# Patient Record
Sex: FEMALE | Race: OTHER
[De-identification: names, ages, dates, MRNs, and addresses within clinical notes are randomized per-mention and may not be internally consistent; named-entity substitution may affect disease eponyms.]

---

## 2017-10-02 ENCOUNTER — APPOINTMENT (OUTPATIENT)
Dept: ORTHOPEDIC SURGERY | Facility: CLINIC | Age: 77
End: 2017-10-02
Payer: MEDICARE

## 2017-10-02 VITALS — SYSTOLIC BLOOD PRESSURE: 154 MMHG | HEART RATE: 80 BPM | DIASTOLIC BLOOD PRESSURE: 78 MMHG

## 2017-10-02 VITALS — HEIGHT: 61 IN | WEIGHT: 135 LBS | BODY MASS INDEX: 25.49 KG/M2

## 2017-10-02 DIAGNOSIS — Z56.0 UNEMPLOYMENT, UNSPECIFIED: ICD-10-CM

## 2017-10-02 DIAGNOSIS — Z78.9 OTHER SPECIFIED HEALTH STATUS: ICD-10-CM

## 2017-10-02 DIAGNOSIS — Z82.61 FAMILY HISTORY OF ARTHRITIS: ICD-10-CM

## 2017-10-02 PROCEDURE — 73564 X-RAY EXAM KNEE 4 OR MORE: CPT | Mod: RT

## 2017-10-02 PROCEDURE — 99204 OFFICE O/P NEW MOD 45 MIN: CPT

## 2017-10-02 RX ORDER — LEVOTHYROXINE SODIUM 137 UG/1
TABLET ORAL
Refills: 0 | Status: ACTIVE | COMMUNITY

## 2017-10-02 SDOH — ECONOMIC STABILITY - INCOME SECURITY: UNEMPLOYMENT, UNSPECIFIED: Z56.0

## 2018-01-22 ENCOUNTER — OUTPATIENT (OUTPATIENT)
Dept: OUTPATIENT SERVICES | Facility: HOSPITAL | Age: 78
LOS: 1 days | End: 2018-01-22
Payer: MEDICARE

## 2018-01-22 VITALS
HEART RATE: 80 BPM | SYSTOLIC BLOOD PRESSURE: 140 MMHG | DIASTOLIC BLOOD PRESSURE: 80 MMHG | TEMPERATURE: 98 F | OXYGEN SATURATION: 96 % | WEIGHT: 147.93 LBS | RESPIRATION RATE: 16 BRPM | HEIGHT: 59.25 IN

## 2018-01-22 DIAGNOSIS — R94.31 ABNORMAL ELECTROCARDIOGRAM [ECG] [EKG]: ICD-10-CM

## 2018-01-22 DIAGNOSIS — Z98.890 OTHER SPECIFIED POSTPROCEDURAL STATES: Chronic | ICD-10-CM

## 2018-01-22 DIAGNOSIS — M17.11 UNILATERAL PRIMARY OSTEOARTHRITIS, RIGHT KNEE: ICD-10-CM

## 2018-01-22 DIAGNOSIS — E03.9 HYPOTHYROIDISM, UNSPECIFIED: ICD-10-CM

## 2018-01-22 LAB
APPEARANCE UR: CLEAR — SIGNIFICANT CHANGE UP
BACTERIA # UR AUTO: SIGNIFICANT CHANGE UP
BILIRUB UR-MCNC: NEGATIVE — SIGNIFICANT CHANGE UP
BLD GP AB SCN SERPL QL: NEGATIVE — SIGNIFICANT CHANGE UP
BLOOD UR QL VISUAL: NEGATIVE — SIGNIFICANT CHANGE UP
BUN SERPL-MCNC: 16 MG/DL — SIGNIFICANT CHANGE UP (ref 7–23)
CALCIUM SERPL-MCNC: 9.2 MG/DL — SIGNIFICANT CHANGE UP (ref 8.4–10.5)
CHLORIDE SERPL-SCNC: 102 MMOL/L — SIGNIFICANT CHANGE UP (ref 98–107)
CO2 SERPL-SCNC: 29 MMOL/L — SIGNIFICANT CHANGE UP (ref 22–31)
COLOR SPEC: YELLOW — SIGNIFICANT CHANGE UP
CREAT SERPL-MCNC: 0.75 MG/DL — SIGNIFICANT CHANGE UP (ref 0.5–1.3)
GLUCOSE SERPL-MCNC: 83 MG/DL — SIGNIFICANT CHANGE UP (ref 70–99)
GLUCOSE UR-MCNC: NEGATIVE — SIGNIFICANT CHANGE UP
HBA1C BLD-MCNC: 5.7 % — HIGH (ref 4–5.6)
HCT VFR BLD CALC: 47.1 % — HIGH (ref 34.5–45)
HGB BLD-MCNC: 14.7 G/DL — SIGNIFICANT CHANGE UP (ref 11.5–15.5)
KETONES UR-MCNC: NEGATIVE — SIGNIFICANT CHANGE UP
LEUKOCYTE ESTERASE UR-ACNC: HIGH
MCHC RBC-ENTMCNC: 29.9 PG — SIGNIFICANT CHANGE UP (ref 27–34)
MCHC RBC-ENTMCNC: 31.2 % — LOW (ref 32–36)
MCV RBC AUTO: 95.7 FL — SIGNIFICANT CHANGE UP (ref 80–100)
MUCOUS THREADS # UR AUTO: SIGNIFICANT CHANGE UP
NITRITE UR-MCNC: NEGATIVE — SIGNIFICANT CHANGE UP
NRBC # FLD: 0 — SIGNIFICANT CHANGE UP
PH UR: 5.5 — SIGNIFICANT CHANGE UP (ref 4.6–8)
PLATELET # BLD AUTO: 238 K/UL — SIGNIFICANT CHANGE UP (ref 150–400)
PMV BLD: 10.4 FL — SIGNIFICANT CHANGE UP (ref 7–13)
POTASSIUM SERPL-MCNC: 3.9 MMOL/L — SIGNIFICANT CHANGE UP (ref 3.5–5.3)
POTASSIUM SERPL-SCNC: 3.9 MMOL/L — SIGNIFICANT CHANGE UP (ref 3.5–5.3)
PROT UR-MCNC: 20 MG/DL — SIGNIFICANT CHANGE UP
RBC # BLD: 4.92 M/UL — SIGNIFICANT CHANGE UP (ref 3.8–5.2)
RBC # FLD: 13 % — SIGNIFICANT CHANGE UP (ref 10.3–14.5)
RBC CASTS # UR COMP ASSIST: SIGNIFICANT CHANGE UP (ref 0–?)
RH IG SCN BLD-IMP: POSITIVE — SIGNIFICANT CHANGE UP
SODIUM SERPL-SCNC: 142 MMOL/L — SIGNIFICANT CHANGE UP (ref 135–145)
SP GR SPEC: 1.03 — SIGNIFICANT CHANGE UP (ref 1–1.04)
SQUAMOUS # UR AUTO: SIGNIFICANT CHANGE UP
UROBILINOGEN FLD QL: NORMAL MG/DL — SIGNIFICANT CHANGE UP
WBC # BLD: 7.08 K/UL — SIGNIFICANT CHANGE UP (ref 3.8–10.5)
WBC # FLD AUTO: 7.08 K/UL — SIGNIFICANT CHANGE UP (ref 3.8–10.5)
WBC UR QL: HIGH (ref 0–?)

## 2018-01-22 PROCEDURE — 93010 ELECTROCARDIOGRAM REPORT: CPT

## 2018-01-22 RX ORDER — SODIUM CHLORIDE 9 MG/ML
1000 INJECTION, SOLUTION INTRAVENOUS
Qty: 0 | Refills: 0 | Status: DISCONTINUED | OUTPATIENT
Start: 2018-01-29 | End: 2018-01-30

## 2018-01-22 RX ORDER — ACETAMINOPHEN 500 MG
975 TABLET ORAL ONCE
Qty: 0 | Refills: 0 | Status: COMPLETED | OUTPATIENT
Start: 2018-01-29 | End: 2018-01-29

## 2018-01-22 RX ORDER — LEVOTHYROXINE SODIUM 125 MCG
1 TABLET ORAL
Qty: 0 | Refills: 0 | COMMUNITY

## 2018-01-22 RX ORDER — TRAMADOL HYDROCHLORIDE 50 MG/1
50 TABLET ORAL ONCE
Qty: 0 | Refills: 0 | Status: DISCONTINUED | OUTPATIENT
Start: 2018-01-29 | End: 2018-01-29

## 2018-01-22 RX ORDER — PANTOPRAZOLE SODIUM 20 MG/1
40 TABLET, DELAYED RELEASE ORAL ONCE
Qty: 0 | Refills: 0 | Status: COMPLETED | OUTPATIENT
Start: 2018-01-29 | End: 2018-01-29

## 2018-01-22 RX ORDER — SODIUM CHLORIDE 9 MG/ML
3 INJECTION INTRAMUSCULAR; INTRAVENOUS; SUBCUTANEOUS EVERY 8 HOURS
Qty: 0 | Refills: 0 | Status: DISCONTINUED | OUTPATIENT
Start: 2018-01-29 | End: 2018-02-01

## 2018-01-22 RX ORDER — GABAPENTIN 400 MG/1
100 CAPSULE ORAL ONCE
Qty: 0 | Refills: 0 | Status: COMPLETED | OUTPATIENT
Start: 2018-01-29 | End: 2018-01-29

## 2018-01-22 NOTE — H&P PST ADULT - RS GEN PE MLT RESP DETAILS PC
breath sounds equal/clear to auscultation bilaterally/respirations non-labored/airway patent/good air movement

## 2018-01-22 NOTE — H&P PST ADULT - ENDOCRINE COMMENTS
H/o hypothyroidism. On Synthroid. Pt reports dose was changed 6 months ago and repeat blood work was done 3 weeks ago and WNL H/o hypothyroidism. On Synthroid. Pt reports dose was changed 6 months ago and repeat blood work was done 3 weeks ago. Thyroid Function WNL

## 2018-01-22 NOTE — H&P PST ADULT - PROBLEM SELECTOR PLAN 1
Scheduled for Robotic Assisted Right Partial Knee Replacement on 1/29/2018.   Preop instructions given, pt verbalized understanding   Chlorhexidine wash and GI prophylaxis provided  Pt was seen by her PCP 1 week ago for medical clearance   Medical clearance note requested

## 2018-01-22 NOTE — H&P PST ADULT - HISTORY OF PRESENT ILLNESS
76 y/o female with PMH of Hypothyroidism and Insomnia presents to PST for preoperative evaluation with dx of unilateral primary ostearthritis right knee. H/o right knee pain x 12 years with arthroscopic surgery and cortisone injections which minimally relieved pain. c/o intermittent aching pain to the right knee which is worse at night and bending down. Scheduled for Robotic Assisted Right Partial Knee Replacement on 1/29/2018. 78 y/o female with PMH of Hypothyroidism and Insomnia presents to PST for preoperative evaluation with dx of unilateral primary ostearthritis right knee. H/o right knee pain x 12 years with arthroscopic surgery and cortisone injections with minimal relied of pain. c/o intermittent aching pain to the right knee which is worse at night and with bending down. Scheduled for Robotic Assisted Right Partial Knee Replacement on 1/29/2018. 76 y/o female with PMH of Hypothyroidism and Insomnia presents to PST for preoperative evaluation with dx of unilateral primary ostearthritis right knee. H/o right knee pain x 12 years with previous arthroscopic knee surgery and cortisone injections. Pt states minimal relief of pain with treatments. c/o intermittent aching pain to the right knee which is worse at night and with bending down. Scheduled for Robotic Assisted Right Partial Knee Replacement on 1/29/2018.

## 2018-01-22 NOTE — H&P PST ADULT - NEGATIVE ENMT SYMPTOMS
no dysphagia/no ear pain/no tinnitus/no hearing difficulty/no nose bleeds/no vertigo/no post-nasal discharge/no gum bleeding

## 2018-01-22 NOTE — H&P PST ADULT - NEGATIVE BREAST SYMPTOMS
no nipple discharge R/no breast tenderness L/no breast tenderness R/no breast lump R/no nipple discharge L

## 2018-01-22 NOTE — H&P PST ADULT - NSANTHOSAYNRD_GEN_A_CORE
No. ALFRED screening performed.  STOP BANG Legend: 0-2 = LOW Risk; 3-4 = INTERMEDIATE Risk; 5-8 = HIGH Risk

## 2018-01-22 NOTE — H&P PST ADULT - PMH
Abnormal EKG    Elevated cholesterol  prescribed Liptior but does not take it.  Hypothyroidism    Insomnia    Primary osteoarthritis of right knee Abnormal EKG  Pt states " I have an abnormally normal EKG"  Elevated cholesterol  prescribed Liptior but does not take it.  Hypothyroidism    Insomnia    Primary osteoarthritis of right knee

## 2018-01-22 NOTE — H&P PST ADULT - PROBLEM/PLAN-1
no abdominal pain, no bloating, no constipation, no diarrhea, no nausea and no vomiting.
DISPLAY PLAN FREE TEXT

## 2018-01-24 LAB
BACTERIA UR CULT: SIGNIFICANT CHANGE UP
SPECIMEN SOURCE: SIGNIFICANT CHANGE UP
SPECIMEN SOURCE: SIGNIFICANT CHANGE UP

## 2018-01-25 LAB — BACTERIA NPH CULT: SIGNIFICANT CHANGE UP

## 2018-01-26 NOTE — ASU PATIENT PROFILE, ADULT - PMH
Abnormal EKG  Pt states " I have an abnormally normal EKG"  Elevated cholesterol  prescribed Liptior but does not take it.  Hypothyroidism    Insomnia    Primary osteoarthritis of right knee

## 2018-01-27 ENCOUNTER — CHART COPY (OUTPATIENT)
Age: 78
End: 2018-01-27

## 2018-01-28 ENCOUNTER — FORM ENCOUNTER (OUTPATIENT)
Age: 78
End: 2018-01-28

## 2018-01-29 ENCOUNTER — APPOINTMENT (OUTPATIENT)
Dept: ORTHOPEDIC SURGERY | Facility: HOSPITAL | Age: 78
End: 2018-01-29

## 2018-01-29 ENCOUNTER — INPATIENT (INPATIENT)
Facility: HOSPITAL | Age: 78
LOS: 2 days | Discharge: HOME HEALTH | End: 2018-02-01
Attending: ORTHOPAEDIC SURGERY | Admitting: ORTHOPAEDIC SURGERY
Payer: MEDICARE

## 2018-01-29 VITALS
DIASTOLIC BLOOD PRESSURE: 66 MMHG | SYSTOLIC BLOOD PRESSURE: 155 MMHG | HEART RATE: 68 BPM | WEIGHT: 147.93 LBS | OXYGEN SATURATION: 96 % | RESPIRATION RATE: 18 BRPM | HEIGHT: 59.25 IN | TEMPERATURE: 98 F

## 2018-01-29 DIAGNOSIS — Z98.890 OTHER SPECIFIED POSTPROCEDURAL STATES: Chronic | ICD-10-CM

## 2018-01-29 DIAGNOSIS — M17.11 UNILATERAL PRIMARY OSTEOARTHRITIS, RIGHT KNEE: ICD-10-CM

## 2018-01-29 LAB — RH IG SCN BLD-IMP: POSITIVE — SIGNIFICANT CHANGE UP

## 2018-01-29 PROCEDURE — 73560 X-RAY EXAM OF KNEE 1 OR 2: CPT | Mod: 26,RT

## 2018-01-29 PROCEDURE — 20985 CPTR-ASST DIR MS PX: CPT

## 2018-01-29 PROCEDURE — 27446 REVISION OF KNEE JOINT: CPT | Mod: RT

## 2018-01-29 RX ORDER — ZOLPIDEM TARTRATE 10 MG/1
5 TABLET ORAL AT BEDTIME
Qty: 0 | Refills: 0 | Status: DISCONTINUED | OUTPATIENT
Start: 2018-01-29 | End: 2018-01-31

## 2018-01-29 RX ORDER — ACETAMINOPHEN 500 MG
1000 TABLET ORAL EVERY 8 HOURS
Qty: 0 | Refills: 0 | Status: DISCONTINUED | OUTPATIENT
Start: 2018-01-29 | End: 2018-01-30

## 2018-01-29 RX ORDER — ACETAMINOPHEN 500 MG
650 TABLET ORAL EVERY 6 HOURS
Qty: 0 | Refills: 0 | Status: DISCONTINUED | OUTPATIENT
Start: 2018-01-29 | End: 2018-01-30

## 2018-01-29 RX ORDER — ONDANSETRON 8 MG/1
4 TABLET, FILM COATED ORAL EVERY 4 HOURS
Qty: 0 | Refills: 0 | Status: DISCONTINUED | OUTPATIENT
Start: 2018-01-29 | End: 2018-01-30

## 2018-01-29 RX ORDER — FENTANYL CITRATE 50 UG/ML
25 INJECTION INTRAVENOUS
Qty: 0 | Refills: 0 | Status: DISCONTINUED | OUTPATIENT
Start: 2018-01-29 | End: 2018-01-30

## 2018-01-29 RX ORDER — DOCUSATE SODIUM 100 MG
100 CAPSULE ORAL THREE TIMES A DAY
Qty: 0 | Refills: 0 | Status: DISCONTINUED | OUTPATIENT
Start: 2018-01-29 | End: 2018-02-01

## 2018-01-29 RX ORDER — TRAMADOL HYDROCHLORIDE 50 MG/1
50 TABLET ORAL EVERY 6 HOURS
Qty: 0 | Refills: 0 | Status: DISCONTINUED | OUTPATIENT
Start: 2018-01-29 | End: 2018-01-30

## 2018-01-29 RX ORDER — OXYCODONE HYDROCHLORIDE 5 MG/1
5 TABLET ORAL EVERY 4 HOURS
Qty: 0 | Refills: 0 | Status: DISCONTINUED | OUTPATIENT
Start: 2018-01-29 | End: 2018-01-30

## 2018-01-29 RX ORDER — ASPIRIN/CALCIUM CARB/MAGNESIUM 324 MG
81 TABLET ORAL
Qty: 0 | Refills: 0 | Status: DISCONTINUED | OUTPATIENT
Start: 2018-01-29 | End: 2018-01-31

## 2018-01-29 RX ORDER — GABAPENTIN 400 MG/1
300 CAPSULE ORAL EVERY 12 HOURS
Qty: 0 | Refills: 0 | Status: DISCONTINUED | OUTPATIENT
Start: 2018-01-29 | End: 2018-01-31

## 2018-01-29 RX ORDER — SODIUM CHLORIDE 9 MG/ML
1000 INJECTION INTRAMUSCULAR; INTRAVENOUS; SUBCUTANEOUS
Qty: 0 | Refills: 0 | Status: DISCONTINUED | OUTPATIENT
Start: 2018-01-29 | End: 2018-01-30

## 2018-01-29 RX ORDER — POLYETHYLENE GLYCOL 3350 17 G/17G
17 POWDER, FOR SOLUTION ORAL DAILY
Qty: 0 | Refills: 0 | Status: DISCONTINUED | OUTPATIENT
Start: 2018-01-29 | End: 2018-02-01

## 2018-01-29 RX ORDER — ONDANSETRON 8 MG/1
4 TABLET, FILM COATED ORAL EVERY 6 HOURS
Qty: 0 | Refills: 0 | Status: DISCONTINUED | OUTPATIENT
Start: 2018-01-29 | End: 2018-02-01

## 2018-01-29 RX ORDER — SENNA PLUS 8.6 MG/1
2 TABLET ORAL AT BEDTIME
Qty: 0 | Refills: 0 | Status: DISCONTINUED | OUTPATIENT
Start: 2018-01-29 | End: 2018-02-01

## 2018-01-29 RX ORDER — LEVOTHYROXINE SODIUM 125 MCG
88 TABLET ORAL DAILY
Qty: 0 | Refills: 0 | Status: DISCONTINUED | OUTPATIENT
Start: 2018-01-29 | End: 2018-02-01

## 2018-01-29 RX ORDER — CEFAZOLIN SODIUM 1 G
2000 VIAL (EA) INJECTION EVERY 8 HOURS
Qty: 0 | Refills: 0 | Status: COMPLETED | OUTPATIENT
Start: 2018-01-30 | End: 2018-01-30

## 2018-01-29 RX ORDER — FENTANYL CITRATE 50 UG/ML
50 INJECTION INTRAVENOUS
Qty: 0 | Refills: 0 | Status: DISCONTINUED | OUTPATIENT
Start: 2018-01-29 | End: 2018-01-30

## 2018-01-29 RX ORDER — PANTOPRAZOLE SODIUM 20 MG/1
40 TABLET, DELAYED RELEASE ORAL
Qty: 0 | Refills: 0 | Status: DISCONTINUED | OUTPATIENT
Start: 2018-01-29 | End: 2018-02-01

## 2018-01-29 RX ORDER — OXYCODONE HYDROCHLORIDE 5 MG/1
10 TABLET ORAL EVERY 4 HOURS
Qty: 0 | Refills: 0 | Status: DISCONTINUED | OUTPATIENT
Start: 2018-01-29 | End: 2018-02-01

## 2018-01-29 RX ORDER — MAGNESIUM HYDROXIDE 400 MG/1
30 TABLET, CHEWABLE ORAL DAILY
Qty: 0 | Refills: 0 | Status: DISCONTINUED | OUTPATIENT
Start: 2018-01-29 | End: 2018-02-01

## 2018-01-29 RX ADMIN — TRAMADOL HYDROCHLORIDE 50 MILLIGRAM(S): 50 TABLET ORAL at 06:47

## 2018-01-29 RX ADMIN — SODIUM CHLORIDE 30 MILLILITER(S): 9 INJECTION, SOLUTION INTRAVENOUS at 06:46

## 2018-01-29 RX ADMIN — Medication 975 MILLIGRAM(S): at 06:47

## 2018-01-29 RX ADMIN — PANTOPRAZOLE SODIUM 40 MILLIGRAM(S): 20 TABLET, DELAYED RELEASE ORAL at 06:46

## 2018-01-29 RX ADMIN — GABAPENTIN 100 MILLIGRAM(S): 400 CAPSULE ORAL at 06:47

## 2018-01-29 NOTE — BRIEF OPERATIVE NOTE - PRE-OP DX
Osteoarthritis (arthritis due to wear and tear of joints)  01/29/2018  right knee medial compartment  Active  Peterson Thorpe

## 2018-01-29 NOTE — BRIEF OPERATIVE NOTE - PROCEDURE
<<-----Click on this checkbox to enter Procedure Arthroplasty, knee, unicompartmental, using robot-assisted navigation, or total knee replacement  01/29/2018  right knee medial  Active  [email or web address]

## 2018-01-30 ENCOUNTER — TRANSCRIPTION ENCOUNTER (OUTPATIENT)
Age: 78
End: 2018-01-30

## 2018-01-30 DIAGNOSIS — Z29.9 ENCOUNTER FOR PROPHYLACTIC MEASURES, UNSPECIFIED: ICD-10-CM

## 2018-01-30 DIAGNOSIS — D72.829 ELEVATED WHITE BLOOD CELL COUNT, UNSPECIFIED: ICD-10-CM

## 2018-01-30 DIAGNOSIS — G47.00 INSOMNIA, UNSPECIFIED: ICD-10-CM

## 2018-01-30 LAB
BUN SERPL-MCNC: 10 MG/DL — SIGNIFICANT CHANGE UP (ref 7–23)
CALCIUM SERPL-MCNC: 8.4 MG/DL — SIGNIFICANT CHANGE UP (ref 8.4–10.5)
CHLORIDE SERPL-SCNC: 106 MMOL/L — SIGNIFICANT CHANGE UP (ref 98–107)
CO2 SERPL-SCNC: 26 MMOL/L — SIGNIFICANT CHANGE UP (ref 22–31)
CREAT SERPL-MCNC: 0.66 MG/DL — SIGNIFICANT CHANGE UP (ref 0.5–1.3)
GLUCOSE SERPL-MCNC: 108 MG/DL — HIGH (ref 70–99)
HCT VFR BLD CALC: 38.7 % — SIGNIFICANT CHANGE UP (ref 34.5–45)
HGB BLD-MCNC: 12.1 G/DL — SIGNIFICANT CHANGE UP (ref 11.5–15.5)
MCHC RBC-ENTMCNC: 29.6 PG — SIGNIFICANT CHANGE UP (ref 27–34)
MCHC RBC-ENTMCNC: 31.3 % — LOW (ref 32–36)
MCV RBC AUTO: 94.6 FL — SIGNIFICANT CHANGE UP (ref 80–100)
NRBC # FLD: 0 — SIGNIFICANT CHANGE UP
PLATELET # BLD AUTO: 239 K/UL — SIGNIFICANT CHANGE UP (ref 150–400)
PMV BLD: 9.4 FL — SIGNIFICANT CHANGE UP (ref 7–13)
POTASSIUM SERPL-MCNC: 4.2 MMOL/L — SIGNIFICANT CHANGE UP (ref 3.5–5.3)
POTASSIUM SERPL-SCNC: 4.2 MMOL/L — SIGNIFICANT CHANGE UP (ref 3.5–5.3)
RBC # BLD: 4.09 M/UL — SIGNIFICANT CHANGE UP (ref 3.8–5.2)
RBC # FLD: 13 % — SIGNIFICANT CHANGE UP (ref 10.3–14.5)
SODIUM SERPL-SCNC: 143 MMOL/L — SIGNIFICANT CHANGE UP (ref 135–145)
WBC # BLD: 13.74 K/UL — HIGH (ref 3.8–10.5)
WBC # FLD AUTO: 13.74 K/UL — HIGH (ref 3.8–10.5)

## 2018-01-30 PROCEDURE — 99223 1ST HOSP IP/OBS HIGH 75: CPT

## 2018-01-30 RX ORDER — IBUPROFEN 200 MG
2 TABLET ORAL
Qty: 0 | Refills: 0 | COMMUNITY

## 2018-01-30 RX ORDER — MELOXICAM 15 MG/1
1 TABLET ORAL
Qty: 30 | Refills: 0
Start: 2018-01-30 | End: 2018-02-28

## 2018-01-30 RX ORDER — ACETAMINOPHEN 500 MG
975 TABLET ORAL EVERY 8 HOURS
Qty: 0 | Refills: 0 | Status: DISCONTINUED | OUTPATIENT
Start: 2018-01-30 | End: 2018-02-01

## 2018-01-30 RX ORDER — MORPHINE SULFATE 50 MG/1
2 CAPSULE, EXTENDED RELEASE ORAL EVERY 4 HOURS
Qty: 0 | Refills: 0 | Status: DISCONTINUED | OUTPATIENT
Start: 2018-01-30 | End: 2018-01-30

## 2018-01-30 RX ORDER — KETOROLAC TROMETHAMINE 30 MG/ML
15 SYRINGE (ML) INJECTION EVERY 6 HOURS
Qty: 0 | Refills: 0 | Status: DISCONTINUED | OUTPATIENT
Start: 2018-01-30 | End: 2018-02-01

## 2018-01-30 RX ORDER — SODIUM CHLORIDE 9 MG/ML
500 INJECTION INTRAMUSCULAR; INTRAVENOUS; SUBCUTANEOUS ONCE
Qty: 0 | Refills: 0 | Status: COMPLETED | OUTPATIENT
Start: 2018-01-30 | End: 2018-01-30

## 2018-01-30 RX ORDER — ZOLPIDEM TARTRATE 10 MG/1
1 TABLET ORAL
Qty: 0 | Refills: 0 | COMMUNITY

## 2018-01-30 RX ORDER — SENNA PLUS 8.6 MG/1
2 TABLET ORAL
Qty: 0 | Refills: 0 | DISCHARGE
Start: 2018-01-30

## 2018-01-30 RX ORDER — SCOPALAMINE 1 MG/3D
1 PATCH, EXTENDED RELEASE TRANSDERMAL ONCE
Qty: 0 | Refills: 0 | Status: COMPLETED | OUTPATIENT
Start: 2018-01-30 | End: 2018-01-31

## 2018-01-30 RX ORDER — PANTOPRAZOLE SODIUM 20 MG/1
1 TABLET, DELAYED RELEASE ORAL
Qty: 0 | Refills: 0 | DISCHARGE
Start: 2018-01-30

## 2018-01-30 RX ORDER — ASPIRIN/CALCIUM CARB/MAGNESIUM 324 MG
1 TABLET ORAL
Qty: 84 | Refills: 0
Start: 2018-01-30 | End: 2018-03-12

## 2018-01-30 RX ORDER — TRAMADOL HYDROCHLORIDE 50 MG/1
1 TABLET ORAL
Qty: 28 | Refills: 0 | OUTPATIENT
Start: 2018-01-30 | End: 2018-02-05

## 2018-01-30 RX ORDER — OXYCODONE HYDROCHLORIDE 5 MG/1
5 TABLET ORAL EVERY 4 HOURS
Qty: 0 | Refills: 0 | Status: DISCONTINUED | OUTPATIENT
Start: 2018-01-30 | End: 2018-02-01

## 2018-01-30 RX ORDER — TRAMADOL HYDROCHLORIDE 50 MG/1
50 TABLET ORAL EVERY 6 HOURS
Qty: 0 | Refills: 0 | Status: DISCONTINUED | OUTPATIENT
Start: 2018-01-30 | End: 2018-02-01

## 2018-01-30 RX ORDER — DOCUSATE SODIUM 100 MG
1 CAPSULE ORAL
Qty: 0 | Refills: 0 | DISCHARGE
Start: 2018-01-30

## 2018-01-30 RX ORDER — GABAPENTIN 400 MG/1
1 CAPSULE ORAL
Qty: 28 | Refills: 0 | OUTPATIENT
Start: 2018-01-30 | End: 2018-02-12

## 2018-01-30 RX ORDER — OXYCODONE HYDROCHLORIDE 5 MG/1
2 TABLET ORAL
Qty: 56 | Refills: 0
Start: 2018-01-30 | End: 2018-02-05

## 2018-01-30 RX ADMIN — POLYETHYLENE GLYCOL 3350 17 GRAM(S): 17 POWDER, FOR SOLUTION ORAL at 12:58

## 2018-01-30 RX ADMIN — Medication 81 MILLIGRAM(S): at 10:11

## 2018-01-30 RX ADMIN — Medication 100 MILLIGRAM(S): at 02:09

## 2018-01-30 RX ADMIN — SODIUM CHLORIDE 3 MILLILITER(S): 9 INJECTION INTRAMUSCULAR; INTRAVENOUS; SUBCUTANEOUS at 05:14

## 2018-01-30 RX ADMIN — Medication 15 MILLIGRAM(S): at 18:58

## 2018-01-30 RX ADMIN — OXYCODONE HYDROCHLORIDE 10 MILLIGRAM(S): 5 TABLET ORAL at 15:50

## 2018-01-30 RX ADMIN — OXYCODONE HYDROCHLORIDE 10 MILLIGRAM(S): 5 TABLET ORAL at 16:30

## 2018-01-30 RX ADMIN — MORPHINE SULFATE 2 MILLIGRAM(S): 50 CAPSULE, EXTENDED RELEASE ORAL at 17:20

## 2018-01-30 RX ADMIN — OXYCODONE HYDROCHLORIDE 10 MILLIGRAM(S): 5 TABLET ORAL at 10:11

## 2018-01-30 RX ADMIN — OXYCODONE HYDROCHLORIDE 10 MILLIGRAM(S): 5 TABLET ORAL at 05:50

## 2018-01-30 RX ADMIN — MORPHINE SULFATE 2 MILLIGRAM(S): 50 CAPSULE, EXTENDED RELEASE ORAL at 02:33

## 2018-01-30 RX ADMIN — MORPHINE SULFATE 2 MILLIGRAM(S): 50 CAPSULE, EXTENDED RELEASE ORAL at 12:58

## 2018-01-30 RX ADMIN — Medication 88 MICROGRAM(S): at 05:17

## 2018-01-30 RX ADMIN — GABAPENTIN 300 MILLIGRAM(S): 400 CAPSULE ORAL at 05:17

## 2018-01-30 RX ADMIN — SODIUM CHLORIDE 3 MILLILITER(S): 9 INJECTION INTRAMUSCULAR; INTRAVENOUS; SUBCUTANEOUS at 12:59

## 2018-01-30 RX ADMIN — ZOLPIDEM TARTRATE 5 MILLIGRAM(S): 10 TABLET ORAL at 02:09

## 2018-01-30 RX ADMIN — Medication 100 MILLIGRAM(S): at 21:33

## 2018-01-30 RX ADMIN — Medication 81 MILLIGRAM(S): at 17:41

## 2018-01-30 RX ADMIN — MORPHINE SULFATE 2 MILLIGRAM(S): 50 CAPSULE, EXTENDED RELEASE ORAL at 13:14

## 2018-01-30 RX ADMIN — SODIUM CHLORIDE 100 MILLILITER(S): 9 INJECTION INTRAMUSCULAR; INTRAVENOUS; SUBCUTANEOUS at 02:09

## 2018-01-30 RX ADMIN — OXYCODONE HYDROCHLORIDE 5 MILLIGRAM(S): 5 TABLET ORAL at 00:25

## 2018-01-30 RX ADMIN — OXYCODONE HYDROCHLORIDE 5 MILLIGRAM(S): 5 TABLET ORAL at 01:10

## 2018-01-30 RX ADMIN — OXYCODONE HYDROCHLORIDE 10 MILLIGRAM(S): 5 TABLET ORAL at 05:17

## 2018-01-30 RX ADMIN — GABAPENTIN 300 MILLIGRAM(S): 400 CAPSULE ORAL at 17:41

## 2018-01-30 RX ADMIN — Medication 100 MILLIGRAM(S): at 10:35

## 2018-01-30 RX ADMIN — MORPHINE SULFATE 2 MILLIGRAM(S): 50 CAPSULE, EXTENDED RELEASE ORAL at 03:00

## 2018-01-30 RX ADMIN — MORPHINE SULFATE 2 MILLIGRAM(S): 50 CAPSULE, EXTENDED RELEASE ORAL at 17:06

## 2018-01-30 RX ADMIN — SODIUM CHLORIDE 1000 MILLILITER(S): 9 INJECTION INTRAMUSCULAR; INTRAVENOUS; SUBCUTANEOUS at 15:51

## 2018-01-30 RX ADMIN — Medication 100 MILLIGRAM(S): at 12:58

## 2018-01-30 RX ADMIN — Medication 975 MILLIGRAM(S): at 18:58

## 2018-01-30 RX ADMIN — SODIUM CHLORIDE 3 MILLILITER(S): 9 INJECTION INTRAMUSCULAR; INTRAVENOUS; SUBCUTANEOUS at 21:32

## 2018-01-30 RX ADMIN — OXYCODONE HYDROCHLORIDE 10 MILLIGRAM(S): 5 TABLET ORAL at 11:00

## 2018-01-30 NOTE — OCCUPATIONAL THERAPY INITIAL EVALUATION ADULT - DIAGNOSIS, OT EVAL
s/p right unicompartmental knee arthroplasty, using robot-assisted navigation; Decreased functional mobility; Decreased ADL management

## 2018-01-30 NOTE — DISCHARGE NOTE ADULT - CARE PROVIDER_API CALL
Juancarlos Thompson (MD), Orthopedics  [address] [place] [address]  Phone: [phone]  Fax: [phone]

## 2018-01-30 NOTE — DISCHARGE NOTE ADULT - HOSPITAL COURSE
76yo is s/p right unicomparmental knee replacement on 1/29/18 without any intraoperative complications.  Pt is doing well and stable for discharge.  Pt is tolerating physical therapy: WBAT with cane/walker if needed, gait training.  Leave Aquacel dressing on until post op office visit (POD#14). Have sutures/staples removed POD#14 if present.  Pt is on enteric coated ASA 81mg PO BID for DVT prophylaxis, take for 6 weeks unless otherwise instructed by surgeon.  follow up with Dr. Thompson in two weeks. Follow up with primary care doctor in 1-2 weeks for continuity of care.

## 2018-01-30 NOTE — PHYSICAL THERAPY INITIAL EVALUATION ADULT - PASSIVE RANGE OF MOTION EXAMINATION, REHAB EVAL
Left LE Passive ROM was WFL (within functional limits)/bilateral upper extremity Passive ROM was WFL (within functional limits)

## 2018-01-30 NOTE — PHYSICAL THERAPY INITIAL EVALUATION ADULT - PATIENT PROFILE REVIEW, REHAB EVAL
yes/ACTIVITY: OOB to chair; spoke with RN Avelina Cordova prior to PT evaluation--> Pt OK for PT consult

## 2018-01-30 NOTE — DISCHARGE NOTE ADULT - PLAN OF CARE
pain control, surgical site healing, ambulation, return to ADL's 78yo is s/p right unicomparmental knee replacement on 1/29/18 without any intraoperative complications.  Pt is doing well and stable for discharge.  Pt is tolerating physical therapy: WBAT with cane/walker if needed, gait training.  Leave Aquacel dressing on until post op office visit (POD#14). Have sutures/staples removed POD#14 if present.  Pt is on enteric coated ASA 81mg PO BID for DVT prophylaxis, take for 6 weeks unless otherwise instructed by surgeon.  follow up with Dr. Thompson in two weeks. Follow up with primary care doctor in 1-2 weeks for continuity of care.

## 2018-01-30 NOTE — DISCHARGE NOTE ADULT - MEDICATION SUMMARY - MEDICATIONS TO TAKE
I will START or STAY ON the medications listed below when I get home from the hospital:    Tylenol 500 mg oral tablet  -- 2 tab(s) by mouth every 8 hours around the clock for 30 days per Dr. Thompson protocol  -- Indication: For Pain control    traMADol 50 mg oral tablet  -- 1 tab(s) by mouth every 6 hours, As needed, Moderate Pain (4 - 6) MDD:4  -- Indication: For Pain control    oxyCODONE 5 mg oral tablet  -- 1-2 tab(s) by mouth every4- 6 hours, As Needed -severe Pain MDD:10   -- Indication: For Pain control    meloxicam 15 mg oral tablet  -- 1 tab(s) by mouth once a day   -- Do not take this drug if you are pregnant.  Obtain medical advice before taking any non-prescription drugs as some may affect the action of this medication.  Take with food or milk.    -- Indication: For Pain control    aspirin 81 mg oral delayed release tablet  -- 1 tab(s) by mouth 2 times a day  -- Indication: For blood clot prevention    gabapentin 300 mg oral capsule  -- 1 cap(s) by mouth every 12 hours  -- Indication: For Pain control    docusate sodium 100 mg oral capsule  -- 1 cap(s) by mouth 3 times a day  -- Indication: For Stool softener, take while taking narcotics    senna oral tablet  -- 2 tab(s) by mouth once a day (at bedtime)  -- Indication: For Stool softener, take while taking narcotics    pantoprazole 40 mg oral delayed release tablet  -- 1 tab(s) by mouth once a day (before a meal)  -- Indication: For Stomach protection while on aspirin and mobic    Synthroid 88 mcg (0.088 mg) oral tablet  -- 1 tab(s) by mouth once a day (at bedtime)  -- Indication: For Home med I will START or STAY ON the medications listed below when I get home from the hospital:    Tylenol 500 mg oral tablet  -- 2 tab(s) by mouth every 8 hours around the clock for 30 days per Dr. Thompson protocol  -- Indication: For Pain control    traMADol 50 mg oral tablet  -- 1 tab(s) by mouth every 6 hours, As needed, Moderate Pain (4 - 6) MDD:4  -- Indication: For Pain control    oxyCODONE 5 mg oral tablet  -- 1-2 tab(s) by mouth every4- 6 hours, As Needed -severe Pain MDD:10   -- Indication: For Pain control    meloxicam 15 mg oral tablet  -- 1 tab(s) by mouth once a day   -- Do not take this drug if you are pregnant.  Obtain medical advice before taking any non-prescription drugs as some may affect the action of this medication.  Take with food or milk.    -- Indication: For Pain control    aspirin 81 mg oral delayed release tablet  -- 1 tab(s) by mouth 2 times a day  -- Indication: For blood clot prevention    gabapentin 300 mg oral capsule  -- 1 cap(s) by mouth every 12 hours  -- Indication: For Pain control    meclizine 12.5 mg oral tablet  -- 1 tab(s) by mouth every 12 hours MDD:2  -- Indication: For Dizziness    docusate sodium 100 mg oral capsule  -- 1 cap(s) by mouth 3 times a day  -- Indication: For Stool softener, take while taking narcotics    senna oral tablet  -- 2 tab(s) by mouth once a day (at bedtime)  -- Indication: For Stool softener, take while taking narcotics    pantoprazole 40 mg oral delayed release tablet  -- 1 tab(s) by mouth once a day (before a meal)  -- Indication: For Stomach protection while on aspirin and mobic    Synthroid 88 mcg (0.088 mg) oral tablet  -- 1 tab(s) by mouth once a day (at bedtime)  -- Indication: For Home med I will START or STAY ON the medications listed below when I get home from the hospital:    Tylenol 500 mg oral tablet  -- 2 tab(s) by mouth every 8 hours around the clock for 30 days per Dr. Thompson protocol  -- Indication: For Pain control    oxyCODONE 5 mg oral tablet  -- 1-2 tab(s) by mouth every4- 6 hours, As Needed -severe Pain MDD:10   -- Indication: For Pain control    meloxicam 15 mg oral tablet  -- 1 tab(s) by mouth once a day   -- Do not take this drug if you are pregnant.  Obtain medical advice before taking any non-prescription drugs as some may affect the action of this medication.  Take with food or milk.    -- Indication: For Pain control    aspirin 81 mg oral delayed release tablet  -- 1 tab(s) by mouth 2 times a day  -- Indication: For blood clot prevention    meclizine 12.5 mg oral tablet  -- 1 tab(s) by mouth every 12 hours MDD:2  -- Indication: For Dizziness    docusate sodium 100 mg oral capsule  -- 1 cap(s) by mouth 3 times a day  -- Indication: For Stool softener, take while taking narcotics    senna oral tablet  -- 2 tab(s) by mouth once a day (at bedtime)  -- Indication: For Stool softener, take while taking narcotics    pantoprazole 40 mg oral delayed release tablet  -- 1 tab(s) by mouth once a day (before a meal)  -- Indication: For Stomach protection while on aspirin and mobic    Synthroid 88 mcg (0.088 mg) oral tablet  -- 1 tab(s) by mouth once a day (at bedtime)  -- Indication: For Home med

## 2018-01-30 NOTE — PHYSICAL THERAPY INITIAL EVALUATION ADULT - GENERAL OBSERVATIONS, REHAB EVAL
Pt encountered in semisupine position, no distress, AxOx4, with +IV, +Hemovac, right knee wrapped in ace bandage dry/intact. PT evaluation performed in conjunction with STACY MCKNIGHT

## 2018-01-30 NOTE — CONSULT NOTE ADULT - ASSESSMENT
77F h/o hypothyroidism, insomnia and HLP presents for preoperative evaluation with dx of unilateral primary ostearthritis of the right knee s/p partial right knee replacement POD#1.

## 2018-01-30 NOTE — PHYSICAL THERAPY INITIAL EVALUATION ADULT - CRITERIA FOR SKILLED THERAPEUTIC INTERVENTIONS
functional limitations in following categories/impairments found/rehab potential/risk reduction/prevention

## 2018-01-30 NOTE — OCCUPATIONAL THERAPY INITIAL EVALUATION ADULT - PERTINENT HX OF CURRENT PROBLEM, REHAB EVAL
Pt is a 77 year old female with PMHx of Hypothyroidism and Insomnia with right knee pain x 12 years with previous arthroscopic knee surgery and cortisone injections. Pt states minimal relief of pain with treatments. Pt with dx of unilateral primary ostearthritis right knee. Pt is now s/p unicompartmental knee arthroplasty, using robot-assisted navigation on 1/29/18. Pt is a 77 year old female with PMHx of Hypothyroidism and Insomnia with right knee pain x 12 years with previous arthroscopic knee surgery and cortisone injections. Pt states minimal relief of pain with treatments. Pt with dx of unilateral primary ostearthritis right knee. Pt is now s/p right unicompartmental knee arthroplasty, using robot-assisted navigation on 1/29/18.

## 2018-01-30 NOTE — DISCHARGE NOTE ADULT - INSTRUCTIONS
no change Call MD for any c/o numbness, tingling, swelling to all extremities, fever, pain not relieved after being medicated with pain medications and a return appointment. Call MD for any c/o numbness, tingling, swelling to all extremities, fever, pain not relieved after being medicated with pain medications and a return appointment.  Take over the counter stool softener to prevent constipation that can be a side effect from taking pain medication. Call MD for any c/o numbness, tingling, swelling to all extremities, fever, pain not relieved after being medicated with pain medications and a return appointment.  Take over the counter stool softener to prevent constipation that can be a side effect from taking pain medication.  Post op appoint with Dr. Thompson @ 2/13/18 @11:15 AM @[facility].

## 2018-01-30 NOTE — DISCHARGE NOTE ADULT - HOME CARE AGENCY
[facility] - [phone]  Nurse to visit the day after hospital discharge; physical therapist to follow. Please contact the home care agency at the above phone number if you have not heard from them by approximately 12 noon on the day after your hospital discharge.

## 2018-01-30 NOTE — PHYSICAL THERAPY INITIAL EVALUATION ADULT - PERTINENT HX OF CURRENT PROBLEM, REHAB EVAL
76 y/o female with PMH of Hypothyroidism and Insomnia presents to PST for preoperative evaluation with dx of unilateral primary ostearthritis right knee. H/o right knee pain x 12 years with previous arthroscopic knee surgery and cortisone injections. Pt states minimal relief of pain with treatments. c/o intermittent aching pain to the right knee which is worse at night and with bending down. Scheduled for Robotic Assisted Right Partial Knee Replacement on 1/29/2018.

## 2018-01-30 NOTE — CONSULT NOTE ADULT - PROBLEM SELECTOR RECOMMENDATION 9
-Pain well controlled; continue management and pain control per ortho recs with morphine IV prn with oxycodone IR prn.   -f/u orthopedic recs

## 2018-01-30 NOTE — DISCHARGE NOTE ADULT - DURABLE MEDICAL EQUIPMENT AGENCY
[facility] [employer] - [phone]  the above company delivered a rolling walker and commode to the patient in hospital on 1/30/18.

## 2018-01-30 NOTE — DISCHARGE NOTE ADULT - MEDICATION SUMMARY - MEDICATIONS TO STOP TAKING
I will STOP taking the medications listed below when I get home from the hospital:    Advil 200 mg oral tablet  -- 2  by mouth four times a week. last dose 1/22

## 2018-01-30 NOTE — PHYSICAL THERAPY INITIAL EVALUATION ADULT - ADDITIONAL COMMENTS
Pt reports that she lives in a private house with  with ~4STE; (+)right rail up/left rail down; bedroom/bathroom on first floor. Prior to hospital admission pt was completely independent and used no assistive device with ambulation. Pt denies any recent falls.    Pt left comfortable in bed, NAD, all lines intact, all precautions maintained, with call bell in reach, /50mmHg, and RN aware of PT evaluation.

## 2018-01-30 NOTE — OCCUPATIONAL THERAPY INITIAL EVALUATION ADULT - MD ORDER
Occupational Therapy (OT) to evaluate and treat. Occupational Therapy (OT) to evaluate and treat. Out of bed to Chair. Per DEEPTHI Quan, pt is okay to participate in OT evaluation and perform activity as tolerated. Occupational Therapy (OT) to evaluate and treat. Out of bed to Chair. Per DEEPTHI SNYDER, pt is okay to participate in OT evaluation and perform activity as tolerated.

## 2018-01-30 NOTE — DISCHARGE NOTE ADULT - CARE PLAN
Principal Discharge DX:	Primary osteoarthritis of right knee  Goal:	pain control, surgical site healing, ambulation, return to ADL's  Assessment and plan of treatment:	78yo is s/p right unicomparmental knee replacement on 1/29/18 without any intraoperative complications.  Pt is doing well and stable for discharge.  Pt is tolerating physical therapy: WBAT with cane/walker if needed, gait training.  Leave Aquacel dressing on until post op office visit (POD#14). Have sutures/staples removed POD#14 if present.  Pt is on enteric coated ASA 81mg PO BID for DVT prophylaxis, take for 6 weeks unless otherwise instructed by surgeon.  follow up with Dr. Thompson in two weeks. Follow up with primary care doctor in 1-2 weeks for continuity of care.

## 2018-01-30 NOTE — CONSULT NOTE ADULT - SUBJECTIVE AND OBJECTIVE BOX
CHIEF COMPLAINT: Patient is a 77y old  Female who presents with a chief complaint of right partial knee replacement (30 Jan 2018 11:08)    HPI: 77F h/o hypothyroidism, insomnia and HLP presents to [facility] for preoperative evaluation with dx of unilateral primary ostearthritis right knee. Pt been c/o right knee pain x 12 years with previous arthroscopic knee surgery and cortisone injections. Pt states minimal relief of pain with treatments. Pt c/o intermittent aching pain to the right knee which is worse at night and with bending down. Scheduled for Robotic Assisted Right Partial Knee Replacement on 1/29/2018. (22 Jan 2018 13:23)    Pain Symptoms if applicable:             	                         none	   mild      moderate     severe  	                            0	    1-3	       4-6	            7-10  Pain: right knee  Location: 3	  Modifying factors: pain with walking	  Associated symptoms: right leg pain	    Allergies: No Known Allergies    Intolerances: aspirin (Stomach Upset)    HOME MEDICATIONS: [x] Reviewed    MEDICATIONS  (STANDING):  aspirin enteric coated 81 milliGRAM(s) Oral two times a day  docusate sodium 100 milliGRAM(s) Oral three times a day  gabapentin 300 milliGRAM(s) Oral every 12 hours  levothyroxine 88 MICROGram(s) Oral daily  pantoprazole    Tablet 40 milliGRAM(s) Oral before breakfast  polyethylene glycol 3350 17 Gram(s) Oral daily  sodium chloride 0.9% lock flush 3 milliLiter(s) IV Push every 8 hours  sodium chloride 0.9%. 1000 milliLiter(s) (100 mL/Hr) IV Continuous <Continuous>    MEDICATIONS  (PRN):  acetaminophen   Tablet 650 milliGRAM(s) Oral every 6 hours PRN For Temp greater than 38 C (100.4 F)  aluminum hydroxide/magnesium hydroxide/simethicone Suspension 30 milliLiter(s) Oral four times a day PRN Indigestion  magnesium hydroxide Suspension 30 milliLiter(s) Oral daily PRN Constipation  morphine  - Injectable 2 milliGRAM(s) IV Push every 4 hours PRN Breakthrough Pain  ondansetron Injectable 4 milliGRAM(s) IV Push every 6 hours PRN Nausea and/or Vomiting  oxyCODONE    IR 5 milliGRAM(s) Oral every 4 hours PRN Mild Pain  oxyCODONE    IR 10 milliGRAM(s) Oral every 4 hours PRN Severe Pain (7 - 10)  senna 2 Tablet(s) Oral at bedtime PRN Constipation  traMADol 50 milliGRAM(s) Oral every 6 hours PRN Moderate Pain (4 - 6)  zolpidem 5 milliGRAM(s) Oral at bedtime PRN Insomnia    PAST MEDICAL & SURGICAL HISTORY:  Abnormal EKG: Pt states &quot; I have an abnormally normal EKG&quot;  Primary osteoarthritis of right knee  Insomnia  Elevated cholesterol: prescribed Liptior but does not take it.  Hypothyroidism  S/P D&C (status post dilation and curettage): 5-6 years ago  H/O arthroscopy of right knee: 2001  [x ] Reviewed     SOCIAL HISTORY:  [x] Substance abuse: caffeine  [x] Tobacco: former smoker  [x] Alcohol use: never    FAMILY HISTORY:  Family history of emphysema (Father)  [x] No pertinent family history in first degree relatives     REVIEW OF SYSTEMS:  [x] All other ROS negative     Vital Signs Last 24 Hrs  T(C): 36.8 (30 Jan 2018 14:05), Max: 36.9 (30 Jan 2018 09:09)  T(F): 98.3 (30 Jan 2018 14:05), Max: 98.5 (30 Jan 2018 09:09)  HR: 83 (30 Jan 2018 14:05) (66 - 89)  BP: 122/39 (30 Jan 2018 14:05) (113/46 - 153/61)  BP(mean): --  RR: 18 (30 Jan 2018 14:05) (10 - 18)  SpO2: 94% (30 Jan 2018 14:05) (94% - 100%)    PHYSICAL EXAM:  GENERAL: NAD, well-groomed, well-developed  HEAD:  Atraumatic, Normocephalic  EYES: EOMI, PERRLA, conjunctiva and sclera clear  ENMT: Moist mucous membranes  NECK: Supple, No JVD  RESPIRATORY: Clear to auscultation bilaterally; No rales, rhonchi, wheezing, or rubs  CARDIOVASCULAR: Regular rate and rhythm; No murmurs, rubs, or gallops  GASTROINTESTINAL: Soft, Nontender, Nondistended; Bowel sounds present  GENITOURINARY: Not examined  EXTREMITIES:  2+ Peripheral Pulses, No clubbing, cyanosis, or edema  NERVOUS SYSTEM:  Alert & Oriented X3; Moving all 4 extremities; No gross sensory deficits  HEME/LYMPH: No lymphadenopathy noted  SKIN: No rashes or lesions; Incisions C/D/I    LABS:                        12.1   13.74 )-----------( 239      ( 30 Jan 2018 06:39 )             38.7     Hemoglobin: 12.1 g/dL (01-30 @ 06:39)    01-30    143  |  106  |  10  ----------------------------<  108<H>  4.2   |  26  |  0.66    Ca    8.4      30 Jan 2018 06:39    CAPILLARY BLOOD GLUCOSE  POCT Blood Glucose.: 91 mg/dL (29 Jan 2018 06:19)    RADIOLOGY & ADDITIONAL STUDIES:  Imaging:   Personally Reviewed:  [x] YES   < from: Xray Knee 1 or 2 Views, Right (01.29.18 @ 21:41) >  IMPRESSION:  Medial right knee unicompartment arthroplasty prosthesis implanted.    Intact and aligned hardware and no periprosthetic fractures.    Postoperative soft tissue changes. Discrete well marginated pin tracks   present in the distal femoral and proximal tibial metadiaphyseal regions.     Surgical skin staples overlie the incision sites.     Correlate with intraoperative findings.     < end of copied text >    [ ] Consultant(s) Notes Reviewed  [x] Care Discussed with Consultants/Other Providers: Ortho PA - discussed disposition

## 2018-01-30 NOTE — PROGRESS NOTE ADULT - SUBJECTIVE AND OBJECTIVE BOX
Patient appears well recovered from anesthesia. Pain well controlled.    Vital Signs Last 24 Hrs  T(C): 36.6 (29 Jan 2018 21:50), Max: 36.8 (29 Jan 2018 06:15)  T(F): 97.9 (29 Jan 2018 21:50), Max: 98.3 (29 Jan 2018 06:15)  HR: 71 (29 Jan 2018 23:15) (62 - 80)  BP: 141/68 (29 Jan 2018 23:15) (116/60 - 159/71)  BP(mean): --  RR: 17 (29 Jan 2018 23:15) (10 - 23)  SpO2: 94% (29 Jan 2018 23:15) (93% - 100%)    Exam:  Gen: NAD  Motor: 5/5 EHL/FHL/TA/Gastrocnemius  Sensory: SILT DP/SP/S/S/T nerve distributions  Vascular: 2+ Dorsalis Pedis pulse  Dressing: Clean, Dry, Intact    A/P: 77 year old female s/p R UKA  - Pain Control  - Regular Diet  - PT/OT, OOB  - Discharge Planning

## 2018-01-30 NOTE — DISCHARGE NOTE ADULT - CONDITIONS AT DISCHARGE
Right knee dressing clean, dry and intact.   Toes warm and mobile +NVS, +cap refill  Tolerated po and fluids.

## 2018-01-30 NOTE — DISCHARGE NOTE ADULT - NS AS DC FOLLOWUP STROKE INST
Influenza vaccination (VIS Pub Date: August 19, 2014)/laxative, gabapentin, aspirin, meloxicam, partial knee replacement, discharge instructions, exercise worksheet, aquacel, tramadol, tylenol laxative, gabapentin, aspirin, meloxicam, partial knee replacement, discharge instructions, exercise worksheet, aquacel, tramadol, tylenol

## 2018-01-30 NOTE — OCCUPATIONAL THERAPY INITIAL EVALUATION ADULT - GENERAL OBSERVATIONS, REHAB EVAL
Pt. received semisupine in bed. No acute distress. Patient agreed to evaluation from Occupational Therapist. +Clean dry intact dressing to Right Knee, +Heplock, +Bilateral foot pumps. PT present bedside to perform PT evaluation in conjunction with OT evaluation.

## 2018-01-30 NOTE — PHYSICAL THERAPY INITIAL EVALUATION ADULT - ACTIVE RANGE OF MOTION EXAMINATION, REHAB EVAL
bilateral upper extremity Active ROM was WFL (within functional limits)/Left LE Active ROM was WFL (within functional limits)/Right Knee 3-95 degrees 2/2 to pain

## 2018-01-30 NOTE — OCCUPATIONAL THERAPY INITIAL EVALUATION ADULT - LIVES WITH, PROFILE
Pt. reports she lives with her  in a house with 4 steps to enter. Once inside, pt. reports she has no steps to negotiate & bedroom and bathroom are located on the main level. Per pt., she has a bathtub in her bathroom with grab bar & "built-in" shower chair available.

## 2018-01-30 NOTE — PHYSICAL THERAPY INITIAL EVALUATION ADULT - DIAGNOSIS, PT EVAL
Pt s/p Right Partial Knee Replacement on o1/29/2018; pt presents with decreased strength and decreased balance.

## 2018-01-30 NOTE — DISCHARGE NOTE ADULT - PATIENT PORTAL LINK FT
“You can access the [facility] Patient Portal, offered by [facility], by registering with the following website: [email or web address]”

## 2018-01-31 LAB
BLD GP AB SCN SERPL QL: NEGATIVE — SIGNIFICANT CHANGE UP
BUN SERPL-MCNC: 16 MG/DL — SIGNIFICANT CHANGE UP (ref 7–23)
CALCIUM SERPL-MCNC: 8.3 MG/DL — LOW (ref 8.4–10.5)
CHLORIDE SERPL-SCNC: 104 MMOL/L — SIGNIFICANT CHANGE UP (ref 98–107)
CO2 SERPL-SCNC: 26 MMOL/L — SIGNIFICANT CHANGE UP (ref 22–31)
CREAT SERPL-MCNC: 0.64 MG/DL — SIGNIFICANT CHANGE UP (ref 0.5–1.3)
GLUCOSE SERPL-MCNC: 119 MG/DL — HIGH (ref 70–99)
HCT VFR BLD CALC: 36.2 % — SIGNIFICANT CHANGE UP (ref 34.5–45)
HGB BLD-MCNC: 11.8 G/DL — SIGNIFICANT CHANGE UP (ref 11.5–15.5)
MCHC RBC-ENTMCNC: 31.4 PG — SIGNIFICANT CHANGE UP (ref 27–34)
MCHC RBC-ENTMCNC: 32.6 % — SIGNIFICANT CHANGE UP (ref 32–36)
MCV RBC AUTO: 96.3 FL — SIGNIFICANT CHANGE UP (ref 80–100)
NRBC # FLD: 0 — SIGNIFICANT CHANGE UP
PLATELET # BLD AUTO: 150 K/UL — SIGNIFICANT CHANGE UP (ref 150–400)
PMV BLD: 9.2 FL — SIGNIFICANT CHANGE UP (ref 7–13)
POTASSIUM SERPL-MCNC: 3.6 MMOL/L — SIGNIFICANT CHANGE UP (ref 3.5–5.3)
POTASSIUM SERPL-SCNC: 3.6 MMOL/L — SIGNIFICANT CHANGE UP (ref 3.5–5.3)
RBC # BLD: 3.76 M/UL — LOW (ref 3.8–5.2)
RBC # FLD: 13.3 % — SIGNIFICANT CHANGE UP (ref 10.3–14.5)
RH IG SCN BLD-IMP: POSITIVE — SIGNIFICANT CHANGE UP
SODIUM SERPL-SCNC: 141 MMOL/L — SIGNIFICANT CHANGE UP (ref 135–145)
WBC # BLD: 10.94 K/UL — HIGH (ref 3.8–10.5)
WBC # FLD AUTO: 10.94 K/UL — HIGH (ref 3.8–10.5)

## 2018-01-31 PROCEDURE — 99232 SBSQ HOSP IP/OBS MODERATE 35: CPT

## 2018-01-31 RX ORDER — ASPIRIN/CALCIUM CARB/MAGNESIUM 324 MG
81 TABLET ORAL
Qty: 0 | Refills: 0 | Status: DISCONTINUED | OUTPATIENT
Start: 2018-01-31 | End: 2018-02-01

## 2018-01-31 RX ORDER — SODIUM CHLORIDE 9 MG/ML
1000 INJECTION, SOLUTION INTRAVENOUS
Qty: 0 | Refills: 0 | Status: DISCONTINUED | OUTPATIENT
Start: 2018-01-31 | End: 2018-02-01

## 2018-01-31 RX ORDER — MECLIZINE HCL 12.5 MG
12.5 TABLET ORAL EVERY 12 HOURS
Qty: 0 | Refills: 0 | Status: DISCONTINUED | OUTPATIENT
Start: 2018-01-31 | End: 2018-02-01

## 2018-01-31 RX ADMIN — OXYCODONE HYDROCHLORIDE 10 MILLIGRAM(S): 5 TABLET ORAL at 11:00

## 2018-01-31 RX ADMIN — Medication 15 MILLIGRAM(S): at 00:37

## 2018-01-31 RX ADMIN — OXYCODONE HYDROCHLORIDE 10 MILLIGRAM(S): 5 TABLET ORAL at 18:43

## 2018-01-31 RX ADMIN — Medication 975 MILLIGRAM(S): at 13:59

## 2018-01-31 RX ADMIN — Medication 88 MICROGRAM(S): at 05:42

## 2018-01-31 RX ADMIN — Medication 81 MILLIGRAM(S): at 05:42

## 2018-01-31 RX ADMIN — Medication 81 MILLIGRAM(S): at 18:42

## 2018-01-31 RX ADMIN — Medication 975 MILLIGRAM(S): at 01:14

## 2018-01-31 RX ADMIN — Medication 975 MILLIGRAM(S): at 21:45

## 2018-01-31 RX ADMIN — Medication 100 MILLIGRAM(S): at 05:43

## 2018-01-31 RX ADMIN — SCOPALAMINE 1 PATCH: 1 PATCH, EXTENDED RELEASE TRANSDERMAL at 06:59

## 2018-01-31 RX ADMIN — SODIUM CHLORIDE 3 MILLILITER(S): 9 INJECTION INTRAMUSCULAR; INTRAVENOUS; SUBCUTANEOUS at 05:41

## 2018-01-31 RX ADMIN — OXYCODONE HYDROCHLORIDE 10 MILLIGRAM(S): 5 TABLET ORAL at 12:00

## 2018-01-31 RX ADMIN — SCOPALAMINE 1 PATCH: 1 PATCH, EXTENDED RELEASE TRANSDERMAL at 18:44

## 2018-01-31 RX ADMIN — GABAPENTIN 300 MILLIGRAM(S): 400 CAPSULE ORAL at 05:42

## 2018-01-31 RX ADMIN — OXYCODONE HYDROCHLORIDE 10 MILLIGRAM(S): 5 TABLET ORAL at 19:30

## 2018-01-31 RX ADMIN — PANTOPRAZOLE SODIUM 40 MILLIGRAM(S): 20 TABLET, DELAYED RELEASE ORAL at 05:42

## 2018-01-31 RX ADMIN — SODIUM CHLORIDE 3 MILLILITER(S): 9 INJECTION INTRAMUSCULAR; INTRAVENOUS; SUBCUTANEOUS at 21:44

## 2018-01-31 RX ADMIN — Medication 12.5 MILLIGRAM(S): at 18:42

## 2018-01-31 RX ADMIN — SODIUM CHLORIDE 3 MILLILITER(S): 9 INJECTION INTRAMUSCULAR; INTRAVENOUS; SUBCUTANEOUS at 13:58

## 2018-01-31 RX ADMIN — Medication 15 MILLIGRAM(S): at 18:42

## 2018-01-31 RX ADMIN — Medication 15 MILLIGRAM(S): at 13:59

## 2018-01-31 RX ADMIN — Medication 15 MILLIGRAM(S): at 05:42

## 2018-01-31 NOTE — PROGRESS NOTE ADULT - SUBJECTIVE AND OBJECTIVE BOX
CHIEF COMPLAINT: f/u right knee replacement    SUBJECTIVE / OVERNIGHT EVENTS: Patient seen and examined. No acute events overnight. Pain well controlled and patient without any complaints.    MEDICATIONS  (STANDING):  acetaminophen   Tablet. 975 milliGRAM(s) Oral every 8 hours  aspirin enteric coated 81 milliGRAM(s) Oral two times a day  docusate sodium 100 milliGRAM(s) Oral three times a day  gabapentin 300 milliGRAM(s) Oral every 12 hours  ketorolac   Injectable 15 milliGRAM(s) IV Push every 6 hours  levothyroxine 88 MICROGram(s) Oral daily  pantoprazole    Tablet 40 milliGRAM(s) Oral before breakfast  polyethylene glycol 3350 17 Gram(s) Oral daily  sodium chloride 0.9% lock flush 3 milliLiter(s) IV Push every 8 hours    MEDICATIONS  (PRN):  aluminum hydroxide/magnesium hydroxide/simethicone Suspension 30 milliLiter(s) Oral four times a day PRN Indigestion  bisacodyl Suppository 10 milliGRAM(s) Rectal daily PRN If no bowel movement by postoperative day #2  magnesium hydroxide Suspension 30 milliLiter(s) Oral daily PRN Constipation  ondansetron Injectable 4 milliGRAM(s) IV Push every 6 hours PRN Nausea and/or Vomiting  oxyCODONE    IR 10 milliGRAM(s) Oral every 4 hours PRN Severe Pain (7 - 10)  oxyCODONE    IR 5 milliGRAM(s) Oral every 4 hours PRN Moderate Pain (4 - 6)  senna 2 Tablet(s) Oral at bedtime PRN Constipation  traMADol 50 milliGRAM(s) Oral every 6 hours PRN Mild Pain (1 - 3)  zolpidem 5 milliGRAM(s) Oral at bedtime PRN Insomnia    VITALS:  T(F): 99.1 (01-31-18 @ 10:34), Max: 99.1 (01-30-18 @ 21:28)  HR: 93 (01-31-18 @ 10:34) (79 - 100)  BP: 142/55 (01-31-18 @ 10:34) (116/39 - 142/55)  RR: 16 (01-31-18 @ 10:34) (16 - 18)  SpO2: 92% (01-31-18 @ 10:34)    PHYSICAL EXAM:  GENERAL: NAD, well-groomed, well-developed  RESPIRATORY: Clear to auscultation bilaterally; No rales, rhonchi, wheezing, or rubs  CARDIOVASCULAR: Regular rate and rhythm; No murmurs, rubs, or gallops  GASTROINTESTINAL: Soft, Nontender, Nondistended; Bowel sounds present  EXTREMITIES:  2+ Peripheral Pulses, No clubbing, cyanosis, or edema  SKIN: No rashes or lesions; Incisions C/D/I  LABS:              12.1                 143  | 26   | 10           13.74 >-----------< 239     ------------------------< 108                   38.7                 4.2  | 106  | 0.66                                         Ca 8.4   Mg x     Ph x        [ ] Consultant(s) Notes Reviewed:  [x] Care Discussed with Consultants/Other Providers: Orthopedic PA - discussed right knee replacement

## 2018-01-31 NOTE — PROGRESS NOTE ADULT - PROBLEM SELECTOR PLAN 1
-Pain well controlled; continue management and pain control per ortho recs with morphine IV prn with oxycodone IR prn.   -f/u orthopedic recs.

## 2018-01-31 NOTE — PROGRESS NOTE ADULT - ASSESSMENT
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

## 2018-01-31 NOTE — PROGRESS NOTE ADULT - SUBJECTIVE AND OBJECTIVE BOX
Patient seen and examined. Pain controlled. No acute events overnight. Patient walked with PT.      MEDICATIONS  (STANDING):  acetaminophen   Tablet. 975 milliGRAM(s) Oral every 8 hours  aspirin enteric coated 81 milliGRAM(s) Oral two times a day  docusate sodium 100 milliGRAM(s) Oral three times a day  gabapentin 300 milliGRAM(s) Oral every 12 hours  ketorolac   Injectable 15 milliGRAM(s) IV Push every 6 hours  levothyroxine 88 MICROGram(s) Oral daily  pantoprazole    Tablet 40 milliGRAM(s) Oral before breakfast  polyethylene glycol 3350 17 Gram(s) Oral daily  scopolamine   Patch 1 Patch Transdermal once  sodium chloride 0.9% lock flush 3 milliLiter(s) IV Push every 8 hours    Allergies    No Known Allergies    Intolerances    aspirin (Stomach Upset)                          12.1   13.74 )-----------( 239      ( 30 Jan 2018 06:39 )             38.7     30 Jan 2018 06:39    143    |  106    |  10     ----------------------------<  108    4.2     |  26     |  0.66     Ca    8.4        30 Jan 2018 06:39        Vital Signs Last 24 Hrs  T(C): 37.2 (01-31-18 @ 05:40), Max: 37.3 (01-30-18 @ 21:28)  T(F): 98.9 (01-31-18 @ 05:40), Max: 99.1 (01-30-18 @ 21:28)  HR: 82 (01-31-18 @ 05:40) (70 - 100)  BP: 118/55 (01-31-18 @ 05:40) (113/46 - 127/43)  BP(mean): --  RR: 16 (01-31-18 @ 05:40) (16 - 18)  SpO2: 96% (01-31-18 @ 05:40) (94% - 100%)    Physical Exam  Gen: NAD  RLE:   aquacell c/d/i  +ehl/fhl/ta/gs function  knee AROM 0-80  L2-S1 silt  Dp/pt pulse intact  No calf ttp  Compartments soft    A/P: 77y Female sp R robot assisted UKA  Pain control  DVT ppx, A81  PT/WBAT/OOB  FU labs  Ice/elevate  Medical management appreciated  Incentive spirometry  Dispo planning, dc home today

## 2018-02-01 VITALS
OXYGEN SATURATION: 98 % | DIASTOLIC BLOOD PRESSURE: 49 MMHG | TEMPERATURE: 99 F | SYSTOLIC BLOOD PRESSURE: 131 MMHG | RESPIRATION RATE: 16 BRPM | HEART RATE: 87 BPM

## 2018-02-01 DIAGNOSIS — R42 DIZZINESS AND GIDDINESS: ICD-10-CM

## 2018-02-01 PROCEDURE — 99232 SBSQ HOSP IP/OBS MODERATE 35: CPT

## 2018-02-01 RX ORDER — MECLIZINE HCL 12.5 MG
1 TABLET ORAL
Qty: 10 | Refills: 0
Start: 2018-02-01 | End: 2018-02-05

## 2018-02-01 RX ADMIN — Medication 15 MILLIGRAM(S): at 13:05

## 2018-02-01 RX ADMIN — Medication 15 MILLIGRAM(S): at 00:58

## 2018-02-01 RX ADMIN — OXYCODONE HYDROCHLORIDE 10 MILLIGRAM(S): 5 TABLET ORAL at 16:05

## 2018-02-01 RX ADMIN — Medication 975 MILLIGRAM(S): at 13:05

## 2018-02-01 RX ADMIN — PANTOPRAZOLE SODIUM 40 MILLIGRAM(S): 20 TABLET, DELAYED RELEASE ORAL at 05:37

## 2018-02-01 RX ADMIN — OXYCODONE HYDROCHLORIDE 10 MILLIGRAM(S): 5 TABLET ORAL at 15:33

## 2018-02-01 RX ADMIN — SODIUM CHLORIDE 3 MILLILITER(S): 9 INJECTION INTRAMUSCULAR; INTRAVENOUS; SUBCUTANEOUS at 13:03

## 2018-02-01 RX ADMIN — OXYCODONE HYDROCHLORIDE 10 MILLIGRAM(S): 5 TABLET ORAL at 08:45

## 2018-02-01 RX ADMIN — Medication 975 MILLIGRAM(S): at 05:37

## 2018-02-01 RX ADMIN — SODIUM CHLORIDE 3 MILLILITER(S): 9 INJECTION INTRAMUSCULAR; INTRAVENOUS; SUBCUTANEOUS at 05:39

## 2018-02-01 RX ADMIN — Medication 88 MICROGRAM(S): at 05:37

## 2018-02-01 RX ADMIN — Medication 15 MILLIGRAM(S): at 05:37

## 2018-02-01 RX ADMIN — Medication 12.5 MILLIGRAM(S): at 05:37

## 2018-02-01 RX ADMIN — Medication 81 MILLIGRAM(S): at 05:37

## 2018-02-01 RX ADMIN — OXYCODONE HYDROCHLORIDE 10 MILLIGRAM(S): 5 TABLET ORAL at 09:30

## 2018-02-01 NOTE — PROGRESS NOTE ADULT - ATTENDING COMMENTS
I agree with the above note and have personally seen and examined this patient. All pertinent films have been reviewed. Please refer to clinical documentation of the history, physical examinations, data summary, and both assessment and plan as documented above and with which I agree.    doing okay, she states pain is 7-9/10 however she is able to sleep the entire night per the nurse without waking up and does not appear to be in excruciating pain  she has some dizziness but is refusing po intake and scopolamine patch.  if sx improve this morning with scopolamine patch and she walks stairs then dc home today with home pt    Juancarlos Thompson MD  Attending Orthopedic Surgeon
I agree with the above note on this patient. All pertinent films have been reviewed. Please refer to clinical documentation of the history, physical examinations, data summary, and both assessment and plan as documented above and with which I agree.    Juancarlos Thompson MD  Attending Orthopedic Surgeon
I agree with the above note on this patient. All pertinent films have been reviewed. Please refer to clinical documentation of the history, physical examinations, data summary, and both assessment and plan as documented above and with which I agree.    Juancarlos Thompson MD  Attending Orthopedic Surgeon
7. Disposition:  -d/c per primary team

## 2018-02-01 NOTE — PROGRESS NOTE ADULT - SUBJECTIVE AND OBJECTIVE BOX
Patient seen and examined. Pain controlled. No acute events overnight. Patient walked with PT yesterday. The patient says her dizziness has now resolved. The patient stated she would like to go home today.      MEDICATIONS  (STANDING):  acetaminophen   Tablet. 975 milliGRAM(s) Oral every 8 hours  aspirin enteric coated 81 milliGRAM(s) Oral two times a day  docusate sodium 100 milliGRAM(s) Oral three times a day  ketorolac   Injectable 15 milliGRAM(s) IV Push every 6 hours  lactated ringers. 1000 milliLiter(s) IV Continuous <Continuous>  levothyroxine 88 MICROGram(s) Oral daily  meclizine 12.5 milliGRAM(s) Oral every 12 hours  pantoprazole    Tablet 40 milliGRAM(s) Oral before breakfast  polyethylene glycol 3350 17 Gram(s) Oral daily  sodium chloride 0.9% lock flush 3 milliLiter(s) IV Push every 8 hours    Allergies    No Known Allergies    Intolerances    aspirin (Stomach Upset)                          11.8   10.94 )-----------( 150      ( 31 Jan 2018 15:19 )             36.2     31 Jan 2018 15:19    141    |  104    |  16     ----------------------------<  119    3.6     |  26     |  0.64     Ca    8.3        31 Jan 2018 15:19        Vital Signs Last 24 Hrs  T(C): 37 (02-01-18 @ 05:30), Max: 37.3 (01-31-18 @ 10:34)  T(F): 98.6 (02-01-18 @ 05:30), Max: 99.1 (01-31-18 @ 10:34)  HR: 77 (02-01-18 @ 05:30) (77 - 94)  BP: 110/53 (02-01-18 @ 05:30) (110/53 - 142/55)  BP(mean): --  RR: 16 (02-01-18 @ 05:30) (16 - 16)  SpO2: 95% (02-01-18 @ 05:30) (92% - 97%)    Physical Exam  Gen: NAD  RLE:   aq[place]ell c/d/i  +ehl/fhl/ta/gs function  PROM 0-80  AROM 0-60  L2-S1 silt  Dp/pt pulse intact  No calf ttp  Compartments soft    A/P: 77y Female sp R robot assisted UKA  Pain control  DVT ppx, A81  PT/WBAT/OOB  Ice/elevate  Medical management appreciated  Incentive spirometry  Dispo planning, home today

## 2018-02-01 NOTE — PROGRESS NOTE ADULT - ASSESSMENT
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

## 2018-02-01 NOTE — PROGRESS NOTE ADULT - SUBJECTIVE AND OBJECTIVE BOX
CHIEF COMPLAINT: f/u partial right knee replacement    SUBJECTIVE / OVERNIGHT EVENTS: Patient seen and examined. No acute events overnight. Pain well controlled and patient without any complaints. Dizzy feeling improved.     MEDICATIONS  (STANDING):  acetaminophen   Tablet. 975 milliGRAM(s) Oral every 8 hours  aspirin enteric coated 81 milliGRAM(s) Oral two times a day  docusate sodium 100 milliGRAM(s) Oral three times a day  ketorolac   Injectable 15 milliGRAM(s) IV Push every 6 hours  lactated ringers. 1000 milliLiter(s) (75 mL/Hr) IV Continuous <Continuous>  levothyroxine 88 MICROGram(s) Oral daily  meclizine 12.5 milliGRAM(s) Oral every 12 hours  pantoprazole    Tablet 40 milliGRAM(s) Oral before breakfast  polyethylene glycol 3350 17 Gram(s) Oral daily  sodium chloride 0.9% lock flush 3 milliLiter(s) IV Push every 8 hours    MEDICATIONS  (PRN):  aluminum hydroxide/magnesium hydroxide/simethicone Suspension 30 milliLiter(s) Oral four times a day PRN Indigestion  bisacodyl Suppository 10 milliGRAM(s) Rectal daily PRN If no bowel movement by postoperative day #2  magnesium hydroxide Suspension 30 milliLiter(s) Oral daily PRN Constipation  ondansetron Injectable 4 milliGRAM(s) IV Push every 6 hours PRN Nausea and/or Vomiting  oxyCODONE    IR 10 milliGRAM(s) Oral every 4 hours PRN Severe Pain (7 - 10)  oxyCODONE    IR 5 milliGRAM(s) Oral every 4 hours PRN Moderate Pain (4 - 6)  senna 2 Tablet(s) Oral at bedtime PRN Constipation  traMADol 50 milliGRAM(s) Oral every 6 hours PRN Mild Pain (1 - 3)      VITALS:  T(F): 98.6 (02-01-18 @ 10:43), Max: 98.9 (01-31-18 @ 13:57)  HR: 87 (02-01-18 @ 10:43) (77 - 94)  BP: 131/49 (02-01-18 @ 10:43) (110/53 - 131/49)  RR: 16 (02-01-18 @ 10:43) (16 - 16)  SpO2: 98% (02-01-18 @ 10:43)    PHYSICAL EXAM:  GENERAL: NAD, well-groomed, well-developed  RESPIRATORY: Clear to auscultation bilaterally; No rales, rhonchi, wheezing, or rubs  CARDIOVASCULAR: Regular rate and rhythm; No murmurs, rubs, or gallops  GASTROINTESTINAL: Soft, Nontender, Nondistended; Bowel sounds present  EXTREMITIES:  2+ Peripheral Pulses, No clubbing, cyanosis, or edema  SKIN: No rashes or lesions; Incisions C/D/I    LABS:              11.8                 141  | 26   | 16           10.94 >-----------< 150     ------------------------< 119                   36.2                 3.6  | 104  | 0.64                                         Ca 8.3   Mg x     Ph x          [ ] Consultant(s) Notes Reviewed:  [x] Care Discussed with Consultants/Other Providers: Orthopedic PA - discussed disposition CHIEF COMPLAINT: f/u partial right knee replacement    SUBJECTIVE / OVERNIGHT EVENTS: Patient seen and examined. No acute events overnight. Pain well controlled and patient without any complaints. Dizzy feeling improved and the patient says her dizziness has now resolved. The patient stated she would like to go home today.    MEDICATIONS  (STANDING):  acetaminophen   Tablet. 975 milliGRAM(s) Oral every 8 hours  aspirin enteric coated 81 milliGRAM(s) Oral two times a day  docusate sodium 100 milliGRAM(s) Oral three times a day  ketorolac   Injectable 15 milliGRAM(s) IV Push every 6 hours  lactated ringers. 1000 milliLiter(s) (75 mL/Hr) IV Continuous <Continuous>  levothyroxine 88 MICROGram(s) Oral daily  meclizine 12.5 milliGRAM(s) Oral every 12 hours  pantoprazole    Tablet 40 milliGRAM(s) Oral before breakfast  polyethylene glycol 3350 17 Gram(s) Oral daily  sodium chloride 0.9% lock flush 3 milliLiter(s) IV Push every 8 hours    MEDICATIONS  (PRN):  aluminum hydroxide/magnesium hydroxide/simethicone Suspension 30 milliLiter(s) Oral four times a day PRN Indigestion  bisacodyl Suppository 10 milliGRAM(s) Rectal daily PRN If no bowel movement by postoperative day #2  magnesium hydroxide Suspension 30 milliLiter(s) Oral daily PRN Constipation  ondansetron Injectable 4 milliGRAM(s) IV Push every 6 hours PRN Nausea and/or Vomiting  oxyCODONE    IR 10 milliGRAM(s) Oral every 4 hours PRN Severe Pain (7 - 10)  oxyCODONE    IR 5 milliGRAM(s) Oral every 4 hours PRN Moderate Pain (4 - 6)  senna 2 Tablet(s) Oral at bedtime PRN Constipation  traMADol 50 milliGRAM(s) Oral every 6 hours PRN Mild Pain (1 - 3)    VITALS:  T(F): 98.6 (02-01-18 @ 10:43), Max: 98.9 (01-31-18 @ 13:57)  HR: 87 (02-01-18 @ 10:43) (77 - 94)  BP: 131/49 (02-01-18 @ 10:43) (110/53 - 131/49)  RR: 16 (02-01-18 @ 10:43) (16 - 16)  SpO2: 98% (02-01-18 @ 10:43)    PHYSICAL EXAM:  GENERAL: NAD, well-groomed, well-developed  RESPIRATORY: Clear to auscultation bilaterally; No rales, rhonchi, wheezing, or rubs  CARDIOVASCULAR: Regular rate and rhythm; No murmurs, rubs, or gallops  GASTROINTESTINAL: Soft, Nontender, Nondistended; Bowel sounds present  EXTREMITIES:  2+ Peripheral Pulses, No clubbing, cyanosis, or edema  SKIN: No rashes or lesions; Incisions C/D/I    LABS:              11.8                 141  | 26   | 16           10.94 >-----------< 150     ------------------------< 119                   36.2                 3.6  | 104  | 0.64                                         Ca 8.3   Mg x     Ph x          [ ] Consultant(s) Notes Reviewed:  [x] Care Discussed with Consultants/Other Providers: Orthopedic PA - discussed disposition

## 2018-02-13 ENCOUNTER — APPOINTMENT (OUTPATIENT)
Dept: ORTHOPEDIC SURGERY | Facility: CLINIC | Age: 78
End: 2018-02-13
Payer: MEDICARE

## 2018-02-13 VITALS — HEART RATE: 76 BPM | SYSTOLIC BLOOD PRESSURE: 150 MMHG | DIASTOLIC BLOOD PRESSURE: 81 MMHG | HEIGHT: 59 IN

## 2018-02-13 DIAGNOSIS — G89.29 PAIN IN RIGHT KNEE: ICD-10-CM

## 2018-02-13 DIAGNOSIS — M25.561 PAIN IN RIGHT KNEE: ICD-10-CM

## 2018-02-13 DIAGNOSIS — M17.11 UNILATERAL PRIMARY OSTEOARTHRITIS, RIGHT KNEE: ICD-10-CM

## 2018-02-13 PROCEDURE — 99024 POSTOP FOLLOW-UP VISIT: CPT

## 2018-02-13 RX ORDER — TRAMADOL HYDROCHLORIDE 50 MG/1
50 TABLET, COATED ORAL
Qty: 50 | Refills: 0 | Status: ACTIVE | COMMUNITY
Start: 2018-02-13 | End: 1900-01-01

## 2018-02-13 RX ORDER — ZOLPIDEM TARTRATE 10 MG/1
10 TABLET ORAL
Qty: 30 | Refills: 0 | Status: ACTIVE | COMMUNITY
Start: 2017-12-11

## 2018-02-20 NOTE — PHYSICAL THERAPY INITIAL EVALUATION ADULT - PERSONAL SAFETY AND JUDGMENT, REHAB EVAL
REASON FOR ADMISSION: Patient is a 83y old  Male who presents with a chief complaint of syncope (20 Feb 2018 02:09)    HISTORY OF PRESENT ILLNESS: 82 y/o Male with PMHx of Type B aortic dissection s/p repair in 2013 at [facility], ESRD, anemia on HD at home M,T,Th,F, pulmonary hypertension, h/o AICD, h/o MVR in 2010 who presented with near syncope and hypotension with SBP of 70s per ED note with confusion post event.     Patient seen and evaluated in the ED this morning; looks comfortable and not in distress; c/o weakness and fatigue for few days now. He was not feeling well during dialysis at home, felt almost fainting and was confused afterwards per report. Patient can not recall the events. Wife reports that he had very low blood pressure and was "in and out" for some time when they decided to bring him in. She reports that patient has had high blood pressure usually but recently his BP has been on the lower side; and 100 systolic "is not bad for him". Patient had no other complaints at that point and denies any complaints now,     In ED patient was given 250cc NS bolus, K found to be 6.1 not hemolyzed, given IV Calcium gluconate, Kayexalate.       REVIEW OF SYSTEMS: 12 review of systems negative except what is stated in HPI    PAST MEDICAL & SURGICAL HISTORY:  Mitral valve disorder  BPH (benign prostatic hypertrophy)  Osteoarthritis: left knee.  CKD (chronic kidney disease)  Aortic dissection  Pacemaker: 10/2010, BIV ICD upgrade in 9/2015  Renal Carcinoma  Intracranial Subdural Hematoma: 1991 - no deficits  Essential Hypertension  PHT (Pulmonary Hypertension)  CHF (Congestive Heart Failure)  Aortic dissection distal to left subclavian: s/p repair 2013  CRF (chronic renal failure): AV fistula right upper arm inserted in 2016 May  Chronic kidney disease (CKD): pt s/p Right AVF, s/p Right First stage basilic vein transposition  Chronic kidney disease (CKD): s/p Right AVF 2/16, 4/16  History of cystoscopy: cryoablation  of prostate 2014  Cataract, bilateral: right 3/2015, right  5/2015  Pacemaker: 2010 [employer] model #2210, with upgrade to AICD in 2015 [employer] model #DU5034  Mitral valve replaced: Bioprosthetic - Dr. Roy 2010  S/P Craniotomy: 1991  S/P Nephrectomy: right partial nephrectomy 2006      SOCIAL HISTORY: no smoking, drinking or drugs    FAMILY HISTORY: FAMILY HISTORY:  Family history of aortic dissection (Sibling)  Family history of stroke (Sibling)  Family history of heart failure (Sibling)  Hypertension (Sibling)  Diabetes mellitus (Sibling)  Family history of CVA      ALLERGIES: Allergies    kiwi (Swelling)  No Known Drug Allergies    Intolerances        Home Medications:  aspirin 81 mg oral tablet: 1 tab(s) orally once a day (12 Jan 2018 17:08)  calcitriol 0.25 mcg oral capsule: 1 cap(s) orally once a day (12 Jan 2018 17:08)  labetalol 200 mg oral tablet: 1 tab(s) orally 3 times a day (12 Jan 2018 17:08)  Multiple Vitamins oral tablet: 1 tab(s) orally once a day (12 Jan 2018 17:08)      MEDICATIONS  (STANDING):  aspirin  chewable 81 milliGRAM(s) Oral daily  calcitriol   Capsule 0.25 MICROGram(s) Oral daily  heparin  Injectable 5000 Unit(s) SubCutaneous every 8 hours  labetalol 200 milliGRAM(s) Oral three times a day  multivitamin 1 Tablet(s) Oral daily    MEDICATIONS  (PRN):      PHYSICAL EXAMINATION  VITAL SIGNS:  Vital Signs Last 24 Hrs  T(C): 36.3 (19 Feb 2018 18:48), Max: 36.9 (19 Feb 2018 14:30)  T(F): 97.3 (19 Feb 2018 18:48), Max: 98.4 (19 Feb 2018 14:30)  HR: 60 (20 Feb 2018 07:00) (60 - 105)  BP: 102/47 (20 Feb 2018 07:00) (93/36 - 110/51)  BP(mean): --  RR: 19 (20 Feb 2018 07:00) (16 - 20)  SpO2: 99% (20 Feb 2018 07:00) (98% - 100%)  I&O's Summary      GA: awake and alert, no distress; coherent and comprehensible  EYES: EOMI, clear conj, anicteric sclera  ENT: DMM, clear OP, neck supple  LUNGS: CTABL, no wrc, normal effort  HEART; S1s2 normal, no mrg, no peripheral edema  ABD; Soft, NT/ND/BS+, no peritoneal signs  EXT; well perfused, no edema or cyanosis; left upper arm fistula  NEURO: AAO x ~3, oriented to person, place and time except the exact date today; knows the month and the year; coherent, sensation and motor intact  SKIN: warm and dry, no rash on visible skin    LABORATORY DATA:                        8.8    7.50  )-----------( 83       ( 20 Feb 2018 06:00 )             27.3     02-20    138  |  93<L>  |  76<H>  ----------------------------<  99  6.4<HH>   |  27  |  8.43<H>    Ca    9.0      20 Feb 2018 06:24  Phos  4.7     02-20  Mg     2.0     02-20    TPro  7.1  /  Alb  3.3  /  TBili  0.7  /  DBili  x   /  AST  98<H>  /  ALT  50<H>  /  AlkPhos  123<H>  02-19    LIVER FUNCTIONS - ( 19 Feb 2018 16:24 )  Alb: 3.3 g/dL / Pro: 7.1 g/dL / ALK PHOS: 123 u/L / ALT: 50 u/L / AST: 98 u/L / GGT: x               IMAGING: Reviewed and as per records: pertinent positives:       ASSESSMENT: This is a 82 y/o Male with PMHx of Type B aortic dissection s/p repair in 2013 at [facility], ESRD, anemia on HD at home M,T,Th,F, pulmonary hypertension, h/o AICD, h/o MVR in 2010 who presented with near syncope and hypotension with SBP of 70s per ED note with confusion post event found to have hyperkalemia and medically treated; in need of HD for which nephrology is consulted.     1. Hyperkalemia; ? diet compliance; ? need for more dialysis or ? fistula/recirculation issues  2. Hypotension and near syncope; workup per primary team  3. ESRD on HD MTTHF at home for 3 hrs and 11 minutes each session per wife; last HD tried yesterday x 1 hr  4. Anemia likely of chronic disease; CKD  5. HTN on labetalol - now on the hypotensive side  6. Secondary Hyperparathyroidism and Hyperphosphatemia- phos level acceptable and on calcitriol    RECOMMENDATIONS:  - dialysis ASAP for 3 hrs on 1 K bath; minimal fluid removal; give extra fluids up to one liter with dialysis if hemodynamics tenuous  - check URR  - will keep on a TTS schedule as inpatient  - continue iron with HD, epo as well  - continue calcitriol home dose  - hold any BP meds  - low K and low phos diet  - dose meds per GFR less than 15 ml/min  - Rest of management per primary team    Thank you for allowing me to participate on this patient's care. Please do not hesitate to call with questions.    I will follow with you.    Santhosh Mishra MD   [facility]  [phone]
[place]  [id number]    Date of Consult: 2/20/18    CHIEF COMPLAINT:weakness    HISTORY OF PRESENT ILLNESS:  83M with ESRD on HD, CAD, MR s/p MVR, PVD, +ICD, hx of type B aortic dissection s/p repair sent in from HD with weakness. per wife at bedside pt has been lethargic with waxing and waning mental status over the past several days with worsening weakness as well. (at baseline pt is alert, able to ambulate). per wife pt without fever/chills, cough. pt does not urinate. per reports, pt was hypotensive in HD today, current BP in ED is 132/63. pt denies active or recent CP, SOB, palpitations, syncope. pt is comfortable appearing, without tachypnea or dyspnea.      Allergies    kiwi (Swelling)  No Known Drug Allergies    Intolerances    	    MEDICATIONS:  aspirin  chewable 81 milliGRAM(s) Oral daily  labetalol 200 milliGRAM(s) Oral three times a day              calcitriol   Capsule 0.25 MICROGram(s) Oral daily  epoetin shawna Injectable 4000 Unit(s) IV Push <User Schedule>  iron sucrose IVPB 100 milliGRAM(s) IV Intermittent <User Schedule>  multivitamin 1 Tablet(s) Oral daily      PAST MEDICAL & SURGICAL HISTORY:  Mitral valve disorder  BPH (benign prostatic hypertrophy)  Osteoarthritis: left knee.  CKD (chronic kidney disease)  Aortic dissection  Pacemaker: 10/2010, BIV ICD upgrade in 9/2015  Renal Carcinoma  Intracranial Subdural Hematoma: 1991 - no deficits  Essential Hypertension  PHT (Pulmonary Hypertension)  CHF (Congestive Heart Failure)  Aortic dissection distal to left subclavian: s/p repair 2013  CRF (chronic renal failure): AV fistula right upper arm inserted in 2016 May  Chronic kidney disease (CKD): pt s/p Right AVF, s/p Right First stage basilic vein transposition  Chronic kidney disease (CKD): s/p Right AVF 2/16, 4/16  History of cystoscopy: cryoablation  of prostate 2014  Cataract, bilateral: right 3/2015, right  5/2015  Pacemaker: 2010 [employer] model #2210, with upgrade to AICD in 2015 [employer] model #[id number]  Mitral valve replaced: Bioprosthetic - Dr. Roy 2010  S/P Craniotomy: 1991  S/P Nephrectomy: right partial nephrectomy 2006      FAMILY HISTORY:  Family history of aortic dissection (Sibling)  Family history of stroke (Sibling)  Family history of heart failure (Sibling)  Hypertension (Sibling)  Diabetes mellitus (Sibling)  Family history of CVA      SOCIAL HISTORY:    lives at home with wife  denies tob/etoh      REVIEW OF SYSTEMS:  See HPI. Otherwise, +confusion, no HA, no CP, SOB, palpitations, syncope, no n/v/abd pain.     PHYSICAL EXAM:  T(C): 36.7 (02-20-18 @ 15:18), Max: 36.7 (02-20-18 @ 15:18)  HR: 66 (02-20-18 @ 15:18) (60 - 67)  BP: 129/45 (02-20-18 @ 15:18) (94/41 - 132/63)  RR: 20 (02-20-18 @ 15:18) (16 - 21)  SpO2: 96% (02-20-18 @ 15:18) (96% - 100%)  Wt(kg): --  I&O's Summary    20 Feb 2018 07:01  -  20 Feb 2018 17:39  --------------------------------------------------------  IN: 800 mL / OUT: 800 mL / NET: 0 mL        Appearance: lethargic but arousable	  HEENT:   Normal oral mucosa, PERRL, EOMI	  Lymphatic: No lymphadenopathy  Cardiovascular: Normal S1 S2, No JVD, No murmurs, No edema  Respiratory: mild bibasilar crackles  Psychiatry: A & O x 3, Mood & affect appropriate  Gastrointestinal:  Soft, Non-tender, + BS	  Skin: No rashes, No ecchymoses, No cyanosis	  Neurologic: Non-focal  Extremities: Normal range of motion, No clubbing, cyanosis       LABS:	 	    CBC Full  -  ( 20 Feb 2018 06:00 )  WBC Count : 7.50 K/uL  Hemoglobin : 8.8 g/dL  Hematocrit : 27.3 %  Platelet Count - Automated : 83 K/uL  Mean Cell Volume : 80.1 fL  Mean Cell Hemoglobin : 25.8 pg  Mean Cell Hemoglobin Concentration : 32.2 %  Auto Neutrophil # : 5.84 K/uL  Auto Lymphocyte # : 0.90 K/uL  Auto Monocyte # : 0.69 K/uL  Auto Eosinophil # : 0.01 K/uL  Auto Basophil # : 0.02 K/uL  Auto Neutrophil % : 77.9 %  Auto Lymphocyte % : 12.0 %  Auto Monocyte % : 9.2 %  Auto Eosinophil % : 0.1 %  Auto Basophil % : 0.3 %    02-20    138  |  93<L>  |  76<H>  ----------------------------<  99  6.4<HH>   |  27  |  8.43<H>  02-19    141  |  94<L>  |  71<H>  ----------------------------<  103<H>  6.2<HH>   |  27  |  8.04<H>    Ca    9.0      20 Feb 2018 06:24  Ca    9.4      19 Feb 2018 21:50  Phos  4.7     02-20  Mg     2.0     02-20    TPro  7.1  /  Alb  3.3  /  TBili  0.7  /  DBili  x   /  AST  98<H>  /  ALT  50<H>  /  AlkPhos  123<H>  02-19      proBNP:   Lipid Profile:   HgA1c: Hemoglobin A1C, Whole Blood: 5.4 % (02-20 @ 06:00)    TSH: Thyroid Stimulating Hormone, Serum: 6.09 uIU/mL (02-20 @ 06:24)        CARDIAC MARKERS:      CKMB: 1.15 ng/mL (02-20 @ 06:24)  CKMB: 1.05 ng/mL (02-20 @ 02:30)  CKMB: 1.13 ng/mL (02-19 @ 16:24)    CKMB Relative Index: Test not performed (02-19 @ 16:24)      TELEMETRY: 	    ECG:  	  RADIOLOGY:  OTHER: 	    PREVIOUS DIAGNOSTIC TESTING:    [ ] Echocardiogram:< from: Transthoracic Echocardiogram (01.15.18 @ 10:11) >  DIMENSIONS:  Dimensions:     Normal Values:  LA:     4.0 cm    2.0 - 4.0 cm  Ao:     3.5 cm    2.0 - 3.8 cm  SEPTUM: 1.5 cm    0.6 - 1.2 cm  PWT:    1.2 cm    0.6 - 1.1 cm  LVIDd:  4.1 cm    3.0 - 5.6 cm  LVIDs:  2.3 cm    1.8 - 4.0 cm  Derived Variables:  LVMI: 125 g/m2  RWT: 0.58  Fractional short: 44 %  Ejection Fraction (Teicholtz): 76 %  ------------------------------------------------------------------------  OBSERVATIONS:  Mitral Valve: Bioprosthetic mitral valve replacement.  Mild-moderate mitral regurgitation. Mean transmitral valve  gradient equals 7 mm Hg, which is elevated even in the  setting of a prosthetic valve.  Aortic Root: Normal aortic root.  Aortic Valve: Calcified trileaflet aortic valve with normal  opening. Moderate-severe aortic regurgitation.  Left Atrium: Severely dilated left atrium.  LA volume index  = 49 cc/m2.  Left Ventricle: Normal left ventricular systolic function.  No segmental wall motion abnormalities. Moderate concentric  left ventricular hypertrophy.  Right Heart: Normal right atrium. A device wire is noted in  the right heart. Normal right ventricular size with  decreased right ventricular systolic function. Normal  tricuspid valve.  Severe tricuspid regurgitation. Normal  pulmonic valve.  Pericardium/PleuraNormal pericardium with no pericardial  effusion.  Hemodynamic: Estimated right ventricular systolic pressure  equals 76 mm Hg, assuming right atrial pressure equals 10  mm Hg, consistent with severe pulmonary hypertension.    < end of copied text >    [ ]  Catheterization:  [ ] Stress Test:  	  	  ASSESSMENT/PLAN: 	    Akshat Montalvo MD
intact

## 2018-03-01 RX ORDER — CELECOXIB 100 MG/1
100 CAPSULE ORAL
Qty: 60 | Refills: 2 | Status: ACTIVE | COMMUNITY
Start: 2018-03-01 | End: 1900-01-01

## 2018-03-13 ENCOUNTER — APPOINTMENT (OUTPATIENT)
Dept: ORTHOPEDIC SURGERY | Facility: CLINIC | Age: 78
End: 2018-03-13
Payer: MEDICARE

## 2018-03-14 ENCOUNTER — APPOINTMENT (OUTPATIENT)
Dept: ORTHOPEDIC SURGERY | Facility: CLINIC | Age: 78
End: 2018-03-14
Payer: MEDICARE

## 2018-03-14 PROCEDURE — 73564 X-RAY EXAM KNEE 4 OR MORE: CPT | Mod: RT

## 2018-03-14 PROCEDURE — 99024 POSTOP FOLLOW-UP VISIT: CPT

## 2018-03-15 ENCOUNTER — CHART COPY (OUTPATIENT)
Age: 78
End: 2018-03-15

## 2018-03-15 LAB
CRP SERPL-MCNC: 0.2 MG/DL
ERYTHROCYTE [SEDIMENTATION RATE] IN BLOOD BY WESTERGREN METHOD: 10 MM/HR

## 2018-04-05 NOTE — ASU PATIENT PROFILE, ADULT - NS SC CAGE ALCOHOL EYE OPENER
Pt is asking for a letter that states he is ok to return to work and is ok to operate a motorized vehicle. Dr GRANT saucedo advise. no

## 2018-04-17 ENCOUNTER — APPOINTMENT (OUTPATIENT)
Dept: ORTHOPEDIC SURGERY | Facility: CLINIC | Age: 78
End: 2018-04-17
Payer: MEDICARE

## 2018-04-17 PROCEDURE — 73562 X-RAY EXAM OF KNEE 3: CPT | Mod: RT

## 2018-04-17 PROCEDURE — 99024 POSTOP FOLLOW-UP VISIT: CPT

## 2018-07-03 ENCOUNTER — CHART COPY (OUTPATIENT)
Age: 78
End: 2018-07-03

## 2018-07-17 PROBLEM — R94.31 ABNORMAL ELECTROCARDIOGRAM [ECG] [EKG]: Chronic | Status: ACTIVE | Noted: 2018-01-22

## 2018-08-01 PROBLEM — E03.9 HYPOTHYROIDISM, UNSPECIFIED: Chronic | Status: ACTIVE | Noted: 2018-01-22

## 2018-08-01 PROBLEM — G47.00 INSOMNIA, UNSPECIFIED: Chronic | Status: ACTIVE | Noted: 2018-01-22

## 2018-08-01 PROBLEM — M17.11 UNILATERAL PRIMARY OSTEOARTHRITIS, RIGHT KNEE: Chronic | Status: ACTIVE | Noted: 2018-01-22

## 2018-08-01 PROBLEM — E78.00 PURE HYPERCHOLESTEROLEMIA, UNSPECIFIED: Chronic | Status: ACTIVE | Noted: 2018-01-22

## 2018-08-06 PROBLEM — Z96.651 STATUS POST RIGHT PARTIAL KNEE REPLACEMENT: Status: ACTIVE | Noted: 2018-01-27

## 2018-08-07 ENCOUNTER — APPOINTMENT (OUTPATIENT)
Dept: ORTHOPEDIC SURGERY | Facility: CLINIC | Age: 78
End: 2018-08-07
Payer: MEDICARE

## 2018-08-07 VITALS
BODY MASS INDEX: 26.9 KG/M2 | HEIGHT: 60 IN | WEIGHT: 137 LBS | HEART RATE: 75 BPM | SYSTOLIC BLOOD PRESSURE: 141 MMHG | DIASTOLIC BLOOD PRESSURE: 77 MMHG

## 2018-08-07 DIAGNOSIS — Z96.651 PRESENCE OF RIGHT ARTIFICIAL KNEE JOINT: ICD-10-CM

## 2018-08-07 PROCEDURE — 73562 X-RAY EXAM OF KNEE 3: CPT | Mod: RT

## 2018-08-07 PROCEDURE — 99213 OFFICE O/P EST LOW 20 MIN: CPT

## 2018-08-17 ENCOUNTER — CHART COPY (OUTPATIENT)
Age: 78
End: 2018-08-17

## 2018-10-10 ENCOUNTER — MOBILE ON CALL (OUTPATIENT)
Age: 78
End: 2018-10-10

## 2018-10-10 ENCOUNTER — CHART COPY (OUTPATIENT)
Age: 78
End: 2018-10-10

## 2018-10-19 ENCOUNTER — CHART COPY (OUTPATIENT)
Age: 78
End: 2018-10-19

## 2018-10-24 ENCOUNTER — CHART COPY (OUTPATIENT)
Age: 78
End: 2018-10-24

## 2018-12-08 NOTE — H&P PST ADULT - NEGATIVE CARDIOVASCULAR SYMPTOMS
Telephone Encounter by Maira Johnson NCMA at 06/26/17 11:52 AM     Author:  Maira Johnson NCMA Service:  (none) Author Type:  Certified Medical Assistant     Filed:  06/26/17 11:53 AM Encounter Date:  6/25/2017 Status:  Signed     :  Maira Johnson NCMA (Certified Medical Assistant)            Refilled per doctors msg below[BP1.1M]       Revision History        User Key Date/Time User Provider Type Action    > BP1.1 06/26/17 11:53 AM Maira Johnson NCMA Certified Medical Assistant Sign    M - Manual             no dyspnea on exertion/no chest pain/no paroxysmal nocturnal dyspnea/no orthopnea/no palpitations

## 2019-04-16 ENCOUNTER — CLINICAL ADVICE (OUTPATIENT)
Age: 79
End: 2019-04-16

## 2019-04-17 ENCOUNTER — APPOINTMENT (OUTPATIENT)
Dept: ORTHOPEDIC SURGERY | Facility: CLINIC | Age: 79
End: 2019-04-17
Payer: MEDICARE

## 2019-04-17 ENCOUNTER — TRANSCRIPTION ENCOUNTER (OUTPATIENT)
Age: 79
End: 2019-04-17

## 2019-04-17 VITALS — HEIGHT: 60 IN | BODY MASS INDEX: 27.88 KG/M2 | WEIGHT: 142 LBS

## 2019-04-17 PROCEDURE — 99215 OFFICE O/P EST HI 40 MIN: CPT

## 2019-04-17 PROCEDURE — 73564 X-RAY EXAM KNEE 4 OR MORE: CPT | Mod: LT

## 2019-04-17 NOTE — DISCUSSION/SUMMARY
[de-identified] : 15 months status post left knee medial unicompartmental knee arthroplasty with failure secondary to likely tibial component loosening as well as chondrocalcinosis in the lateral compartment.  We had more than a 60-minute discussion as well as several telephone conversations about this and she would now like to proceed with a revision total knee arthroplasty which will be more complicated given the fact that she has some medial tibial plateau bone loss secondary to the implant and this will require augments.  \par \par The patient is an appropriate candidate for consideration of right revision total knee arthroplasty. This recommendation is based on the patient's pain, function, and bone stock. An extensive discussion was conducted of the natural history of this particular problem and the variety of surgical and non-surgical treatment options available to the patient. A risk/benefit analysis was discussed with the patient reviewing the advantages and disadvantages of surgical intervention at this time. A full explanation was given of the nature and the purpose of the procedure and anesthesia, its benefits, possible alternative methods of diagnosis or treatment, the risks involved, the possibility of complications, the foreseeable consequences of the procedure and the possible results of the non-treatment.\par \par No guarantee or assurance was made as to the results that may be obtained. Specifically, the risks were identified to include, but are not limited to the following: Infection, phlebitis, pulmonary embolism, death, paralysis, dislocation, pain, stiffness, instability, limp, weakness, breakage, leg-length inequality, uncontrolled bleeding, nerve injury, blood vessel injury, pressure sores, anesthetic risks, delayed healing of wound and bone, and wear and loosening. Further discussion was undertaken with the patient about the details of surgical preparation, treatment, and postoperative rehabilitation including medical clearance, autotransfusion, the hospital course, and the postoperative rehabilitation involved. Reimplantation may require cemented or cementless components, or both, depending upon a variety of factors that must be assessed at the time of surgery. The need for bone graft (either autograft or allograft) to enhance the chance for success of the procedure(s) was discussed. Furthermore, the patient was advised that banked homologous blood transfusion may be required. All in all, I feel that this patient is a good candidate for surgical reconstruction.\par \par At several times during the course of the consenting process I again highlighted the fact that I will not guarantee any improvement or change in her pain level with this revision surgery and she may continue to have the same pain and disability after the surgery.\par \par This patient is taking narcotics preoperatively and understands our narcotic cessation policy and that continued preoperative usage of narcotics will make it more difficult to control postoperative pain.\par

## 2019-04-17 NOTE — PHYSICAL EXAM
[de-identified] : Patient is well nourished, well-developed, in no acute distress, with appropriate mood and affect. The patient is oriented to time, place, and person. Respirations are even and unlabored. Gait evaluation does not reveal a limp. There is no inguinal adenopathy. Examination of the contralateral knee shows normal range of motion, strength, no tenderness, and intact skin. The affected limb is well-perfused and showed 2+ dp/pt pulses, with a well-healed midline surgical scar, shows a grossly normal motor and sensory examination. Knee motion is painless and the knee moves from 0-135 degrees. The knee is stable within that range-of-motion to AP and ML stress with a 1A Lachman, negative anterior or posterior drawer and no instability to varus or valgus stress. The alignment of the knee is 5° varus. No effusion or crepitus is noted. Mild tenderness to palpation about the medial tibial plateau and over patella tendon insertion into patella. Miild ttp in the lateral joint line. No tenderness to palpation about the medial joint line, lateral tibial plateau, medial or lateral femoral condyle, medial or lateral patellar facets, superior pole of the patella. No pain with resisted leg extension. 5/5 extensor mechanism strength. Muscle strength is normal. Pedal pulses are palpable. Hip examination was negative. Able to squat. [de-identified] :  AP and lateral knee and sunrise x-rays of the left knee were reviewed. No obvious change in alignment of the implants from prior radiographs however there is stable lucency adjacent to tibial baseplate possible suggestive of possible loosening. Also chondrocalcinosis is visible in the lateral compartment of the knee.

## 2019-04-17 NOTE — HISTORY OF PRESENT ILLNESS
[de-identified] : This is a 77yo female who is approximately 15 months status-post right medial unicompartmental knee arthroplasty.  Despite her wound recovering well and objectively recovering well she has never been happy with the surgery.  She has always complained of medially based pain in the proximal medial tibia and is now complaining of laterally based pain in the lateral joint line.  She takes tramadol daily because of the pain.  The pain limits her activities of daily living and walking tolerance is reduced.  No fevers or chills.  Infection has been previously worked up with ESR and CRP.  She does not use a cane or walker for ambulation.  She states that she cannot walk more than 4-5 blocks.  She is seen several doctors in the local area who have told her that the implant appears to be functioning well with so a doctor down in [place] recently who told her that there is failure of the partial knee replacement secondary to component loosening.  She does not use a brace but says that she tried this in the past and it did not help. Objectively, excellent progress is noted in terms of pain and restoration of function. She is ambulating without a limp and actively moves her knee to full extension and flexion without hesitation. She can climb up the step in the exam room without holding on for support and can sit in a AMY position.  Has done a prolonged course of physical therapy which helped somewhat but is not currently doing physical therapy. No giving way episodes in the knee. She has had JOSE since I saw her last which did help some of her leg pain for her chronic radiculopathy.  I have spoken with her on the telephone several times since I saw her last and she is always been upset with her outcome but understanding that there are risks in the surgery and no guarantee was ever made about improving her pain.  She has tried a peripheral nerve block injection which did not improve the pain as well.

## 2019-05-17 ENCOUNTER — APPOINTMENT (OUTPATIENT)
Dept: ORTHOPEDIC SURGERY | Facility: CLINIC | Age: 79
End: 2019-05-17
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
## 2020-05-07 NOTE — ASU PATIENT PROFILE, ADULT - NS PRO TALK SOMEONE YN
May 7, 2020      Tato Ramachandran MD  [address]  [address]  [facility] [address]           [facility]  [address]  [facility] [address]  Phone: [phone]  Fax: [phone]          Patient: nAgel Mosquera   MR Number: [id number]   YOB: 1978   Date of Visit: 5/7/2020       Dear Dr. Tato Ramachandran:    Thank you for referring Angel Mosquera to me for evaluation. Attached you will find relevant portions of my assessment and plan of care.    If you have questions, please do not hesitate to call me. I look forward to following Angel Mosquera along with you.    Sincerely,    Debbi Wilkerson PA-C    Enclosure  CC:  No Recipients    If you would like to receive this communication electronically, please contact [email or web address] or [phone] to request more information on [facility] Link access.    For providers and/or their staff who would like to refer a patient to [facility], please contact us through our one-stop-shop provider referral line, [facility], at [phone].    If you feel you have received this communication in error or would no longer like to receive these types of communications, please e-mail [email or web address]         
no

## 2020-08-13 ENCOUNTER — OUTPATIENT (OUTPATIENT)
Dept: OUTPATIENT SERVICES | Facility: HOSPITAL | Age: 80
LOS: 1 days | End: 2020-08-13
Payer: MEDICARE

## 2020-08-13 VITALS
DIASTOLIC BLOOD PRESSURE: 73 MMHG | OXYGEN SATURATION: 96 % | SYSTOLIC BLOOD PRESSURE: 152 MMHG | RESPIRATION RATE: 14 BRPM | WEIGHT: 154.1 LBS | HEART RATE: 82 BPM | TEMPERATURE: 98 F | HEIGHT: 60 IN

## 2020-08-13 DIAGNOSIS — Z98.890 OTHER SPECIFIED POSTPROCEDURAL STATES: Chronic | ICD-10-CM

## 2020-08-13 DIAGNOSIS — M17.12 UNILATERAL PRIMARY OSTEOARTHRITIS, LEFT KNEE: ICD-10-CM

## 2020-08-13 DIAGNOSIS — Z96.651 PRESENCE OF RIGHT ARTIFICIAL KNEE JOINT: Chronic | ICD-10-CM

## 2020-08-13 DIAGNOSIS — Z01.818 ENCOUNTER FOR OTHER PREPROCEDURAL EXAMINATION: ICD-10-CM

## 2020-08-13 LAB
A1C WITH ESTIMATED AVERAGE GLUCOSE RESULT: 5.4 % — SIGNIFICANT CHANGE UP (ref 4–5.6)
ALBUMIN SERPL ELPH-MCNC: 3.8 G/DL — SIGNIFICANT CHANGE UP (ref 3.3–5)
ALP SERPL-CCNC: 120 U/L — SIGNIFICANT CHANGE UP (ref 40–120)
ALT FLD-CCNC: 14 U/L — SIGNIFICANT CHANGE UP (ref 12–78)
ANION GAP SERPL CALC-SCNC: 3 MMOL/L — LOW (ref 5–17)
APTT BLD: 31.6 SEC — SIGNIFICANT CHANGE UP (ref 27.5–35.5)
AST SERPL-CCNC: 13 U/L — LOW (ref 15–37)
BILIRUB SERPL-MCNC: 0.4 MG/DL — SIGNIFICANT CHANGE UP (ref 0.2–1.2)
BUN SERPL-MCNC: 14 MG/DL — SIGNIFICANT CHANGE UP (ref 7–23)
CALCIUM SERPL-MCNC: 9.3 MG/DL — SIGNIFICANT CHANGE UP (ref 8.5–10.1)
CHLORIDE SERPL-SCNC: 110 MMOL/L — HIGH (ref 96–108)
CO2 SERPL-SCNC: 31 MMOL/L — SIGNIFICANT CHANGE UP (ref 22–31)
CREAT SERPL-MCNC: 0.72 MG/DL — SIGNIFICANT CHANGE UP (ref 0.5–1.3)
ESTIMATED AVERAGE GLUCOSE: 108 MG/DL — SIGNIFICANT CHANGE UP (ref 68–114)
GLUCOSE SERPL-MCNC: 91 MG/DL — SIGNIFICANT CHANGE UP (ref 70–99)
HCT VFR BLD CALC: 43.5 % — SIGNIFICANT CHANGE UP (ref 34.5–45)
HGB BLD-MCNC: 14 G/DL — SIGNIFICANT CHANGE UP (ref 11.5–15.5)
INR BLD: 1.01 RATIO — SIGNIFICANT CHANGE UP (ref 0.88–1.16)
MCHC RBC-ENTMCNC: 30 PG — SIGNIFICANT CHANGE UP (ref 27–34)
MCHC RBC-ENTMCNC: 32.2 GM/DL — SIGNIFICANT CHANGE UP (ref 32–36)
MCV RBC AUTO: 93.1 FL — SIGNIFICANT CHANGE UP (ref 80–100)
MRSA PCR RESULT.: SIGNIFICANT CHANGE UP
NRBC # BLD: 0 /100 WBCS — SIGNIFICANT CHANGE UP (ref 0–0)
PLATELET # BLD AUTO: 242 K/UL — SIGNIFICANT CHANGE UP (ref 150–400)
POTASSIUM SERPL-MCNC: 4.5 MMOL/L — SIGNIFICANT CHANGE UP (ref 3.5–5.3)
POTASSIUM SERPL-SCNC: 4.5 MMOL/L — SIGNIFICANT CHANGE UP (ref 3.5–5.3)
PROT SERPL-MCNC: 7.3 G/DL — SIGNIFICANT CHANGE UP (ref 6–8.3)
PROTHROM AB SERPL-ACNC: 11.8 SEC — SIGNIFICANT CHANGE UP (ref 10.6–13.6)
RBC # BLD: 4.67 M/UL — SIGNIFICANT CHANGE UP (ref 3.8–5.2)
RBC # FLD: 12.7 % — SIGNIFICANT CHANGE UP (ref 10.3–14.5)
S AUREUS DNA NOSE QL NAA+PROBE: SIGNIFICANT CHANGE UP
SODIUM SERPL-SCNC: 144 MMOL/L — SIGNIFICANT CHANGE UP (ref 135–145)
WBC # BLD: 5.82 K/UL — SIGNIFICANT CHANGE UP (ref 3.8–10.5)
WBC # FLD AUTO: 5.82 K/UL — SIGNIFICANT CHANGE UP (ref 3.8–10.5)

## 2020-08-13 PROCEDURE — 71046 X-RAY EXAM CHEST 2 VIEWS: CPT | Mod: 26

## 2020-08-13 PROCEDURE — 93005 ELECTROCARDIOGRAM TRACING: CPT

## 2020-08-13 PROCEDURE — 73560 X-RAY EXAM OF KNEE 1 OR 2: CPT

## 2020-08-13 PROCEDURE — 73560 X-RAY EXAM OF KNEE 1 OR 2: CPT | Mod: 26,LT

## 2020-08-13 PROCEDURE — 80053 COMPREHEN METABOLIC PANEL: CPT

## 2020-08-13 PROCEDURE — 86900 BLOOD TYPING SEROLOGIC ABO: CPT

## 2020-08-13 PROCEDURE — 71046 X-RAY EXAM CHEST 2 VIEWS: CPT

## 2020-08-13 PROCEDURE — 85027 COMPLETE CBC AUTOMATED: CPT

## 2020-08-13 PROCEDURE — 85610 PROTHROMBIN TIME: CPT

## 2020-08-13 PROCEDURE — 85730 THROMBOPLASTIN TIME PARTIAL: CPT

## 2020-08-13 PROCEDURE — 87641 MR-STAPH DNA AMP PROBE: CPT

## 2020-08-13 PROCEDURE — 86850 RBC ANTIBODY SCREEN: CPT

## 2020-08-13 PROCEDURE — 83036 HEMOGLOBIN GLYCOSYLATED A1C: CPT

## 2020-08-13 PROCEDURE — G0463: CPT

## 2020-08-13 PROCEDURE — 87640 STAPH A DNA AMP PROBE: CPT

## 2020-08-13 PROCEDURE — 86901 BLOOD TYPING SEROLOGIC RH(D): CPT

## 2020-08-13 PROCEDURE — 93010 ELECTROCARDIOGRAM REPORT: CPT

## 2020-08-13 RX ORDER — ACETAMINOPHEN 500 MG
2 TABLET ORAL
Qty: 0 | Refills: 0 | DISCHARGE

## 2020-08-13 NOTE — H&P PST ADULT - MUSCULOSKELETAL COMMENTS
LEFT knee Pain secondary to "Bone on Bone" - SEE HPI - LEFT knee brace in place LEFT Knee Pain - decreased ROM and strength - knee brace in place

## 2020-08-13 NOTE — H&P PST ADULT - CIGARETTE, PACK YRS
Please remind pt of office refill policy  No longer accepting auto-refill requests from pharmacies due to medication reconciliation errors      DUE FOR PHYSICAL
Sent patient a message via [address]
3

## 2020-08-13 NOTE — H&P PST ADULT - NSICDXPASTSURGICALHX_GEN_ALL_CORE_FT
PAST SURGICAL HISTORY:  H/O arthroscopy of right knee 2001    H/O colonoscopy     H/O endoscopy     H/O total knee replacement, right May 2019    S/P D&C (status post dilation and curettage) 5-6 years ago    Status post right partial knee replacement January 2018

## 2020-08-13 NOTE — H&P PST ADULT - NSICDXPASTMEDICALHX_GEN_ALL_CORE_FT
PAST MEDICAL HISTORY:  Abnormal EKG Pt states " I have an abnormally normal EKG"    Elevated cholesterol prescribed Liptior but does not take it.    Hypothyroidism     Insomnia     Primary osteoarthritis of right knee     Unilateral primary osteoarthritis, left knee

## 2020-08-13 NOTE — H&P PST ADULT - NSICDXPROBLEM_GEN_ALL_CORE_FT
PROBLEM DIAGNOSES  Problem: Unilateral primary osteoarthritis, left knee  Assessment and Plan: PST Labs; CBC, CMP, Coags, Type & Screen, EKG, CXR, HgB A1C (as per bundle), Nose Culture R/O MRSA/MSSA, LEFT Knee Xrays - Medical clearanc scheduled with PCP on Monday 8/17/2020. Pt instructed to stop any NSAIDS/herbal Supplements between now and procedure. may take Tylenol if needed for pain between now and procedure. Morning of procedure pt instructed to take her Synthroid with small sip of water. Pre-op instructions as well as pre-op wash instructions given to pt with understanding verbalized. Pt will have COVID swab done at [facility] - they will contact her regarding appointment for COVID swab. Pt given number for [place] PST should she have any questions regarding swab. Understandng of all instructions verbalized.

## 2020-08-13 NOTE — H&P PST ADULT - NSICDXFAMILYHX_GEN_ALL_CORE_FT
FAMILY HISTORY:  Family history of hiatal hernia, Mother -  Age 97    Father  Still living? Unknown  Family history of emphysema, Age at diagnosis: Age Unknown

## 2020-08-13 NOTE — H&P PST ADULT - NEGATIVE ENMT SYMPTOMS
no sinus symptoms/no vertigo/no throat pain/no dysphagia/no post-nasal discharge/no nasal congestion/no abnormal taste sensation/no tinnitus/no hearing difficulty

## 2020-08-13 NOTE — H&P PST ADULT - ASSESSMENT
80 year old female Unilateral Primary osteoarthritis, LEFT knee - Scheduled for LEFT Total Knee Replacement with Dr Leonidas Garcia on 8/24/2020.

## 2020-08-13 NOTE — H&P PST ADULT - HISTORY OF PRESENT ILLNESS
80 year old female PMH Hypothyroidism presents for PST prior to LEFT Total Knee Replacement with Dr Leonidas Garcia on 8/24/2020. Pt notes prior H/O Osteoarthritis bilateral knees - notes prior partial right knee replacement January 2018 with Dr Juancarlos Thompson  which was then followed by right total knee revision/replacement May 2019 with Dr Sandoval @ [facility] - pt notes she was given Epidural when she had partial right knee revision  - she notes while getting Spinal during the revision she feels something went wrong as she had lot of pain and needed general anesthesia - pt notes following this she developed drop foot in right foot for 6 months - pt got EMG which noted the drop foot came from spine - pt feels during this time she was compensating and putting more pressure on her LEFT knee -pt notes LEFT knee has gotten progressively worse and is now "Bone on Bone." Pt rates pain #8-9/10 on pain scale. Takes Tramadol daily in AM. Wears LEFT Knee Brace - does not use cane or walker for ambulation assistance.  Following discussions with Dr Garcia pt is electing for scheduled procedure.

## 2020-08-13 NOTE — H&P PST ADULT - LAST STRESS TEST
Nuclear stress test "maybe 3 years ago was just routine and I passed it" - cannot remember where it was done

## 2020-08-19 PROBLEM — M17.12 UNILATERAL PRIMARY OSTEOARTHRITIS, LEFT KNEE: Chronic | Status: ACTIVE | Noted: 2020-08-13

## 2020-08-21 ENCOUNTER — OUTPATIENT (OUTPATIENT)
Dept: OUTPATIENT SERVICES | Facility: HOSPITAL | Age: 80
LOS: 1 days | End: 2020-08-21
Payer: MEDICARE

## 2020-08-21 DIAGNOSIS — Z98.890 OTHER SPECIFIED POSTPROCEDURAL STATES: Chronic | ICD-10-CM

## 2020-08-21 DIAGNOSIS — Z11.59 ENCOUNTER FOR SCREENING FOR OTHER VIRAL DISEASES: ICD-10-CM

## 2020-08-21 DIAGNOSIS — Z96.651 PRESENCE OF RIGHT ARTIFICIAL KNEE JOINT: Chronic | ICD-10-CM

## 2020-08-21 PROCEDURE — U0003: CPT

## 2020-08-22 LAB — SARS-COV-2 RNA SPEC QL NAA+PROBE: SIGNIFICANT CHANGE UP

## 2020-08-23 ENCOUNTER — TRANSCRIPTION ENCOUNTER (OUTPATIENT)
Age: 80
End: 2020-08-23

## 2020-08-24 ENCOUNTER — RESULT REVIEW (OUTPATIENT)
Age: 80
End: 2020-08-24

## 2020-08-24 ENCOUNTER — TRANSCRIPTION ENCOUNTER (OUTPATIENT)
Age: 80
End: 2020-08-24

## 2020-08-24 ENCOUNTER — INPATIENT (INPATIENT)
Facility: HOSPITAL | Age: 80
LOS: 3 days | Discharge: HOME | DRG: 470 | End: 2020-08-28
Attending: ORTHOPAEDIC SURGERY | Admitting: ORTHOPAEDIC SURGERY
Payer: MEDICARE

## 2020-08-24 VITALS
HEART RATE: 84 BPM | DIASTOLIC BLOOD PRESSURE: 91 MMHG | HEIGHT: 60 IN | SYSTOLIC BLOOD PRESSURE: 155 MMHG | OXYGEN SATURATION: 97 % | RESPIRATION RATE: 13 BRPM | WEIGHT: 149.91 LBS | TEMPERATURE: 99 F

## 2020-08-24 DIAGNOSIS — M17.12 UNILATERAL PRIMARY OSTEOARTHRITIS, LEFT KNEE: ICD-10-CM

## 2020-08-24 DIAGNOSIS — Z96.651 PRESENCE OF RIGHT ARTIFICIAL KNEE JOINT: Chronic | ICD-10-CM

## 2020-08-24 DIAGNOSIS — Z98.890 OTHER SPECIFIED POSTPROCEDURAL STATES: Chronic | ICD-10-CM

## 2020-08-24 LAB
ANION GAP SERPL CALC-SCNC: 6 MMOL/L — SIGNIFICANT CHANGE UP (ref 5–17)
BUN SERPL-MCNC: 7 MG/DL — SIGNIFICANT CHANGE UP (ref 7–23)
CALCIUM SERPL-MCNC: 8.8 MG/DL — SIGNIFICANT CHANGE UP (ref 8.5–10.1)
CHLORIDE SERPL-SCNC: 109 MMOL/L — HIGH (ref 96–108)
CO2 SERPL-SCNC: 24 MMOL/L — SIGNIFICANT CHANGE UP (ref 22–31)
CREAT SERPL-MCNC: 0.7 MG/DL — SIGNIFICANT CHANGE UP (ref 0.5–1.3)
GLUCOSE SERPL-MCNC: 132 MG/DL — HIGH (ref 70–99)
HCT VFR BLD CALC: 40.8 % — SIGNIFICANT CHANGE UP (ref 34.5–45)
HGB BLD-MCNC: 13.4 G/DL — SIGNIFICANT CHANGE UP (ref 11.5–15.5)
MCHC RBC-ENTMCNC: 30.1 PG — SIGNIFICANT CHANGE UP (ref 27–34)
MCHC RBC-ENTMCNC: 32.8 GM/DL — SIGNIFICANT CHANGE UP (ref 32–36)
MCV RBC AUTO: 91.7 FL — SIGNIFICANT CHANGE UP (ref 80–100)
NRBC # BLD: 0 /100 WBCS — SIGNIFICANT CHANGE UP (ref 0–0)
PLATELET # BLD AUTO: 265 K/UL — SIGNIFICANT CHANGE UP (ref 150–400)
POTASSIUM SERPL-MCNC: 4.3 MMOL/L — SIGNIFICANT CHANGE UP (ref 3.5–5.3)
POTASSIUM SERPL-SCNC: 4.3 MMOL/L — SIGNIFICANT CHANGE UP (ref 3.5–5.3)
RBC # BLD: 4.45 M/UL — SIGNIFICANT CHANGE UP (ref 3.8–5.2)
RBC # FLD: 12.7 % — SIGNIFICANT CHANGE UP (ref 10.3–14.5)
SODIUM SERPL-SCNC: 139 MMOL/L — SIGNIFICANT CHANGE UP (ref 135–145)
WBC # BLD: 12.54 K/UL — HIGH (ref 3.8–10.5)
WBC # FLD AUTO: 12.54 K/UL — HIGH (ref 3.8–10.5)

## 2020-08-24 PROCEDURE — 88305 TISSUE EXAM BY PATHOLOGIST: CPT | Mod: 26

## 2020-08-24 PROCEDURE — 73562 X-RAY EXAM OF KNEE 3: CPT | Mod: 26,LT

## 2020-08-24 PROCEDURE — 88311 DECALCIFY TISSUE: CPT | Mod: 26

## 2020-08-24 RX ORDER — ACETAMINOPHEN 500 MG
1000 TABLET ORAL EVERY 6 HOURS
Refills: 0 | Status: DISCONTINUED | OUTPATIENT
Start: 2020-08-25 | End: 2020-08-28

## 2020-08-24 RX ORDER — HYDROMORPHONE HYDROCHLORIDE 2 MG/ML
0.5 INJECTION INTRAMUSCULAR; INTRAVENOUS; SUBCUTANEOUS
Refills: 0 | Status: DISCONTINUED | OUTPATIENT
Start: 2020-08-24 | End: 2020-08-24

## 2020-08-24 RX ORDER — FOLIC ACID 0.8 MG
1 TABLET ORAL DAILY
Refills: 0 | Status: DISCONTINUED | OUTPATIENT
Start: 2020-08-24 | End: 2020-08-28

## 2020-08-24 RX ORDER — SODIUM CHLORIDE 9 MG/ML
1000 INJECTION, SOLUTION INTRAVENOUS
Refills: 0 | Status: DISCONTINUED | OUTPATIENT
Start: 2020-08-24 | End: 2020-08-24

## 2020-08-24 RX ORDER — HYDROMORPHONE HYDROCHLORIDE 2 MG/ML
0.2 INJECTION INTRAMUSCULAR; INTRAVENOUS; SUBCUTANEOUS
Refills: 0 | Status: DISCONTINUED | OUTPATIENT
Start: 2020-08-24 | End: 2020-08-24

## 2020-08-24 RX ORDER — CEFAZOLIN SODIUM 1 G
2000 VIAL (EA) INJECTION ONCE
Refills: 0 | Status: COMPLETED | OUTPATIENT
Start: 2020-08-24 | End: 2020-08-24

## 2020-08-24 RX ORDER — POLYETHYLENE GLYCOL 3350 17 G/17G
17 POWDER, FOR SOLUTION ORAL DAILY
Refills: 0 | Status: DISCONTINUED | OUTPATIENT
Start: 2020-08-24 | End: 2020-08-28

## 2020-08-24 RX ORDER — LANOLIN ALCOHOL/MO/W.PET/CERES
3 CREAM (GRAM) TOPICAL AT BEDTIME
Refills: 0 | Status: DISCONTINUED | OUTPATIENT
Start: 2020-08-24 | End: 2020-08-28

## 2020-08-24 RX ORDER — BUPIVACAINE 13.3 MG/ML
20 INJECTION, SUSPENSION, LIPOSOMAL INFILTRATION ONCE
Refills: 0 | Status: DISCONTINUED | OUTPATIENT
Start: 2020-08-24 | End: 2020-08-24

## 2020-08-24 RX ORDER — HYDROMORPHONE HYDROCHLORIDE 2 MG/ML
1 INJECTION INTRAMUSCULAR; INTRAVENOUS; SUBCUTANEOUS ONCE
Refills: 0 | Status: DISCONTINUED | OUTPATIENT
Start: 2020-08-24 | End: 2020-08-24

## 2020-08-24 RX ORDER — FERROUS SULFATE 325(65) MG
325 TABLET ORAL
Refills: 0 | Status: DISCONTINUED | OUTPATIENT
Start: 2020-08-24 | End: 2020-08-28

## 2020-08-24 RX ORDER — LEVOTHYROXINE SODIUM 125 MCG
88 TABLET ORAL DAILY
Refills: 0 | Status: DISCONTINUED | OUTPATIENT
Start: 2020-08-24 | End: 2020-08-28

## 2020-08-24 RX ORDER — ZOLPIDEM TARTRATE 10 MG/1
1 TABLET ORAL
Qty: 0 | Refills: 0 | DISCHARGE

## 2020-08-24 RX ORDER — TRAMADOL HYDROCHLORIDE 50 MG/1
100 TABLET ORAL EVERY 6 HOURS
Refills: 0 | Status: DISCONTINUED | OUTPATIENT
Start: 2020-08-24 | End: 2020-08-25

## 2020-08-24 RX ORDER — HYDROMORPHONE HYDROCHLORIDE 2 MG/ML
0.5 INJECTION INTRAMUSCULAR; INTRAVENOUS; SUBCUTANEOUS EVERY 4 HOURS
Refills: 0 | Status: DISCONTINUED | OUTPATIENT
Start: 2020-08-24 | End: 2020-08-28

## 2020-08-24 RX ORDER — RIVAROXABAN 15 MG-20MG
10 KIT ORAL DAILY
Refills: 0 | Status: DISCONTINUED | OUTPATIENT
Start: 2020-08-25 | End: 2020-08-28

## 2020-08-24 RX ORDER — ACETAMINOPHEN 500 MG
1000 TABLET ORAL ONCE
Refills: 0 | Status: COMPLETED | OUTPATIENT
Start: 2020-08-24 | End: 2020-08-24

## 2020-08-24 RX ORDER — ONDANSETRON 8 MG/1
4 TABLET, FILM COATED ORAL EVERY 6 HOURS
Refills: 0 | Status: DISCONTINUED | OUTPATIENT
Start: 2020-08-24 | End: 2020-08-28

## 2020-08-24 RX ORDER — SODIUM CHLORIDE 9 MG/ML
1000 INJECTION, SOLUTION INTRAVENOUS
Refills: 0 | Status: DISCONTINUED | OUTPATIENT
Start: 2020-08-24 | End: 2020-08-25

## 2020-08-24 RX ORDER — MAGNESIUM HYDROXIDE 400 MG/1
30 TABLET, CHEWABLE ORAL DAILY
Refills: 0 | Status: DISCONTINUED | OUTPATIENT
Start: 2020-08-24 | End: 2020-08-28

## 2020-08-24 RX ORDER — ACETAMINOPHEN 500 MG
1000 TABLET ORAL ONCE
Refills: 0 | Status: COMPLETED | OUTPATIENT
Start: 2020-08-25 | End: 2020-08-25

## 2020-08-24 RX ORDER — ASCORBIC ACID 60 MG
500 TABLET,CHEWABLE ORAL
Refills: 0 | Status: DISCONTINUED | OUTPATIENT
Start: 2020-08-24 | End: 2020-08-28

## 2020-08-24 RX ORDER — CELECOXIB 200 MG/1
200 CAPSULE ORAL
Refills: 0 | Status: DISCONTINUED | OUTPATIENT
Start: 2020-08-24 | End: 2020-08-28

## 2020-08-24 RX ORDER — LEVOTHYROXINE SODIUM 125 MCG
1 TABLET ORAL
Qty: 0 | Refills: 0 | DISCHARGE

## 2020-08-24 RX ORDER — SENNA PLUS 8.6 MG/1
2 TABLET ORAL AT BEDTIME
Refills: 0 | Status: DISCONTINUED | OUTPATIENT
Start: 2020-08-24 | End: 2020-08-28

## 2020-08-24 RX ORDER — ONDANSETRON 8 MG/1
4 TABLET, FILM COATED ORAL ONCE
Refills: 0 | Status: DISCONTINUED | OUTPATIENT
Start: 2020-08-24 | End: 2020-08-24

## 2020-08-24 RX ORDER — TRAMADOL HYDROCHLORIDE 50 MG/1
50 TABLET ORAL EVERY 6 HOURS
Refills: 0 | Status: DISCONTINUED | OUTPATIENT
Start: 2020-08-24 | End: 2020-08-25

## 2020-08-24 RX ORDER — CEFAZOLIN SODIUM 1 G
2000 VIAL (EA) INJECTION EVERY 8 HOURS
Refills: 0 | Status: COMPLETED | OUTPATIENT
Start: 2020-08-24 | End: 2020-08-25

## 2020-08-24 RX ORDER — CEFAZOLIN SODIUM 1 G
2000 VIAL (EA) INJECTION EVERY 8 HOURS
Refills: 0 | Status: DISCONTINUED | OUTPATIENT
Start: 2020-08-24 | End: 2020-08-24

## 2020-08-24 RX ADMIN — HYDROMORPHONE HYDROCHLORIDE 0.5 MILLIGRAM(S): 2 INJECTION INTRAMUSCULAR; INTRAVENOUS; SUBCUTANEOUS at 14:24

## 2020-08-24 RX ADMIN — ONDANSETRON 4 MILLIGRAM(S): 8 TABLET, FILM COATED ORAL at 16:28

## 2020-08-24 RX ADMIN — Medication 1000 MILLIGRAM(S): at 20:55

## 2020-08-24 RX ADMIN — Medication 3 MILLIGRAM(S): at 23:27

## 2020-08-24 RX ADMIN — HYDROMORPHONE HYDROCHLORIDE 0.5 MILLIGRAM(S): 2 INJECTION INTRAMUSCULAR; INTRAVENOUS; SUBCUTANEOUS at 14:09

## 2020-08-24 RX ADMIN — HYDROMORPHONE HYDROCHLORIDE 0.2 MILLIGRAM(S): 2 INJECTION INTRAMUSCULAR; INTRAVENOUS; SUBCUTANEOUS at 13:49

## 2020-08-24 RX ADMIN — Medication 400 MILLIGRAM(S): at 20:23

## 2020-08-24 RX ADMIN — HYDROMORPHONE HYDROCHLORIDE 0.2 MILLIGRAM(S): 2 INJECTION INTRAMUSCULAR; INTRAVENOUS; SUBCUTANEOUS at 13:39

## 2020-08-24 RX ADMIN — TRAMADOL HYDROCHLORIDE 100 MILLIGRAM(S): 50 TABLET ORAL at 23:27

## 2020-08-24 RX ADMIN — HYDROMORPHONE HYDROCHLORIDE 0.5 MILLIGRAM(S): 2 INJECTION INTRAMUSCULAR; INTRAVENOUS; SUBCUTANEOUS at 14:40

## 2020-08-24 RX ADMIN — SODIUM CHLORIDE 100 MILLILITER(S): 9 INJECTION, SOLUTION INTRAVENOUS at 16:22

## 2020-08-24 RX ADMIN — CELECOXIB 200 MILLIGRAM(S): 200 CAPSULE ORAL at 18:33

## 2020-08-24 RX ADMIN — SODIUM CHLORIDE 100 MILLILITER(S): 9 INJECTION, SOLUTION INTRAVENOUS at 13:40

## 2020-08-24 RX ADMIN — HYDROMORPHONE HYDROCHLORIDE 0.2 MILLIGRAM(S): 2 INJECTION INTRAMUSCULAR; INTRAVENOUS; SUBCUTANEOUS at 14:09

## 2020-08-24 RX ADMIN — Medication 100 MILLIGRAM(S): at 18:55

## 2020-08-24 RX ADMIN — HYDROMORPHONE HYDROCHLORIDE 0.5 MILLIGRAM(S): 2 INJECTION INTRAMUSCULAR; INTRAVENOUS; SUBCUTANEOUS at 14:25

## 2020-08-24 RX ADMIN — SODIUM CHLORIDE 75 MILLILITER(S): 9 INJECTION, SOLUTION INTRAVENOUS at 09:33

## 2020-08-24 NOTE — DISCHARGE NOTE PROVIDER - HOSPITAL COURSE
80 year old female PMH Hypothyroidism presents for PST prior to LEFT Total Knee Replacement with Dr Leonidas Garcia on 8/24/2020. Pt notes prior H/O Osteoarthritis bilateral knees - notes prior partial right knee replacement January 2018 with Dr Juancarlos Thompson  which was then followed by right total knee revision/replacement May 2019 with Dr Sandoval @ [facility] - pt notes she was given Epidural when she had partial right knee revision  - she notes while getting Spinal during the revision she feels something went wrong as she had lot of pain and needed general anesthesia - pt notes following this she developed drop foot in right foot for 6 months - pt got EMG which noted the drop foot came from spine - pt feels during this time she was compensating and putting more pressure on her LEFT knee -pt notes LEFT knee has gotten progressively worse and is now "Bone on Bone." Pt rates pain #8-9/10 on pain scale. Takes Tramadol daily in AM. Wears LEFT Knee Brace - does not use cane or walker for ambulation assistance.  Following discussions with Dr Garcia pt is electing for scheduled procedure.         Hospital course:        Patient underwent successful L TKR without complications, was sent to PACU then to the floor for monitoring.          Patient was continued on antibiotics for 2 doses, given pain control. Pt was given Xarelto POD 1 after H/H determined to be non-concerning. Medicine consulted for medical management of comorbidities.         Disposition: Plan for d/c to _____________ on _____________.  Patient will continue weight bearing as tolerated, continue strengthening and ROM exercises. She will follow-up with Dr. Garcia in his office in 2 weeks.  Aquacel dressing changed on date of discharge, and wound will be re-evaluated on next office visit. 80 year old female PMH Hypothyroidism presents for PST prior to LEFT Total Knee Replacement with Dr Leonidas Garcia on 8/24/2020. Pt notes prior H/O Osteoarthritis bilateral knees - notes prior partial right knee replacement January 2018 with Dr Juancarlos Thompson  which was then followed by right total knee revision/replacement May 2019 with Dr Sandoval @ [facility] - pt notes she was given Epidural when she had partial right knee revision  - she notes while getting Spinal during the revision she feels something went wrong as she had lot of pain and needed general anesthesia - pt notes following this she developed drop foot in right foot for 6 months - pt got EMG which noted the drop foot came from spine - pt feels during this time she was compensating and putting more pressure on her LEFT knee -pt notes LEFT knee has gotten progressively worse and is now "Bone on Bone." Pt rates pain #8-9/10 on pain scale. Takes Tramadol daily in AM. Wears LEFT Knee Brace - does not use cane or walker for ambulation assistance.  Following discussions with Dr Garica pt is electing for scheduled procedure.         Hospital course:        Patient underwent successful L TKR without complications, was sent to PACU then to the floor for monitoring.          Patient was continued on antibiotics for 2 doses, given pain control. Pt was given Xarelto POD 1 after H/H determined to be non-concerning. Medicine consulted for medical management of comorbidities.         Disposition: Plan for d/c to home on 8/26/2020.  Patient will continue weight bearing as tolerated, continue strengthening and ROM exercises. She will follow-up with Dr. Garcia in his office in 2 weeks.  Aquacel dressing changed on date of discharge, and wound will be re-evaluated on next office visit.

## 2020-08-24 NOTE — DISCHARGE NOTE PROVIDER - NSDCACTIVITY_GEN_ALL_CORE
Do not make important decisions/Showering allowed/No heavy lifting/straining/Do not drive or operate machinery Do not make important decisions/Walking - Outdoors allowed/Walking - Indoors allowed/Do not drive or operate machinery/Showering allowed/No heavy lifting/straining

## 2020-08-24 NOTE — PHYSICAL THERAPY INITIAL EVALUATION ADULT - ADDITIONAL COMMENTS
Patient lives in a ranch home with 3 steps to enter, landing, then 3 more steps +rails. DME includes RW, w/c, grab bars in a stall shower.

## 2020-08-24 NOTE — PROGRESS NOTE ADULT - SUBJECTIVE AND OBJECTIVE BOX
Orthopaedic PA    Procedure: s/p L  TKR  Surgeon: Radha    80y Female comfortable, with complaint of nausea and pain    Denies chest pain, shortness of breath, palpitations, nausea, vomiting, dizziness, fevers, chills    PE:  Vital Signs Last 24 Hrs  T(C): 36.5 (24 Aug 2020 20:29), Max: 37.1 (24 Aug 2020 08:39)  T(F): 97.7 (24 Aug 2020 20:29), Max: 98.8 (24 Aug 2020 08:39)  HR: 79 (24 Aug 2020 20:29) (57 - 95)  BP: 137/62 (24 Aug 2020 20:29) (105/62 - 155/91)  BP(mean): --  RR: 17 (24 Aug 2020 20:29) (13 - 20)  SpO2: 93% (24 Aug 2020 20:29) (86% - 100%)  General:  A + O x 3 in NAD    Knee: Aquacel  Dressing: C/D/I     Lower Extremity Exam:         5/5 Tibialis Anterior ( dorsiflexion )      5/5 Gastrocsoleus ( plantarflexion )      5/5 Extensor Hallucis Longus ( toe extension )      5/5  Flexor Hallucis Longus ( toe flexion)            Sensation intact to Light touch L2-S1    +2 DP/PT Pulses  Compartments soft and compressible  No calf tenderness bilaterally                          13.4   12.54 )-----------( 265      ( 24 Aug 2020 14:08 )             40.8       08-24    139  |  109<H>  |  7   ----------------------------<  132<H>  4.3   |  24  |  0.70    Ca    8.8      24 Aug 2020 14:08          A/P: 80y Female stable s/p    L   TKR     - Pain control   - Incentive spirometer  - DVT ppx: SCD / Chemical   - Ambulation as tolerated  - PT, OT  - F/U AM Labs  - Discharge planning

## 2020-08-24 NOTE — DISCHARGE NOTE PROVIDER - NSDCFUADDINST_GEN_ALL_CORE_FT
WEIGHT BEARING AS TOLERATED.   XARELTO 10 MG DAILY FOR TOTAL OF 12 DAYS AS OF 08/25/2020  FOLLOW UP WITH DR. CLEMENT  IN 2 WEEKS CALL [phone] FOR AN APPOINTMENT.  KEEP KNEE AQUACEL  DRESSING  ON DRY AND CLEAN, IT WILL BE CHANGED OR REMOVED ON YOUR NEXT OFFICE VISIT /MAY REMOVE 8 DAYS POST HOSPITAL DISCHARGE .

## 2020-08-24 NOTE — DISCHARGE NOTE PROVIDER - CARE PROVIDER_API CALL
Leonidas Garcia  ORTHOPAEDIC SURGERY  [address] [place] [address]  Phone: [phone]  Fax: [phone]  Follow Up Time: 2 weeks

## 2020-08-24 NOTE — PHYSICAL THERAPY INITIAL EVALUATION ADULT - RANGE OF MOTION EXAMINATION, REHAB EVAL
bilateral upper extremity ROM was WFL (within functional limits)/deficits as listed below/L knee ROM limited due to sx/Right LE ROM was WFL (within functional limits)

## 2020-08-24 NOTE — DISCHARGE NOTE PROVIDER - NSDCCPCAREPLAN_GEN_ALL_CORE_FT
PRINCIPAL DISCHARGE DIAGNOSIS  Diagnosis: Arthritis of knee, left  Assessment and Plan of Treatment:

## 2020-08-24 NOTE — CONSULT NOTE ADULT - SUBJECTIVE AND OBJECTIVE BOX
Patient is a 80y old  Female who presents with a chief complaint of Pt states "I am getting my LEFT Knee Replaced." (13 Aug 2020 10:35)      INTERVAL HPI/OVERNIGHT EVENTS:  T(C): 36.4 (08-25-20 @ 05:12), Max: 37.1 (08-24-20 @ 08:39)  HR: 91 (08-25-20 @ 05:12) (57 - 95)  BP: 130/82 (08-25-20 @ 05:12) (105/62 - 155/91)  RR: 18 (08-25-20 @ 05:12) (13 - 20)  SpO2: 95% (08-25-20 @ 05:12) (86% - 100%)  Wt(kg): --  I&O's Summary    24 Aug 2020 07:01  -  25 Aug 2020 06:32  --------------------------------------------------------  IN: 1970 mL / OUT: 1500 mL / NET: 470 mL        PAST MEDICAL & SURGICAL HISTORY:  Unilateral primary osteoarthritis, left knee  Abnormal EKG: Pt states &quot; I have an abnormally normal EKG&quot;  Primary osteoarthritis of right knee  Insomnia  Elevated cholesterol: prescribed Liptior but does not take it.  Hypothyroidism  H/O endoscopy  H/O colonoscopy  H/O total knee replacement, right: May 2019  Status post right partial knee replacement: January 2018  S/P D&C (status post dilation and curettage): 5-6 years ago  H/O arthroscopy of right knee: 2001      SOCIAL HISTORY  Alcohol: neg  Tobacco: neg  Illicit substance use: neg      FAMILY HISTORY: non contrib    Home Medications:  Ambien 5 mg oral tablet: 1 tab(s) orally once a day (at bedtime) (24 Aug 2020 08:57)  CeleBREX 200 mg oral capsule: 2 tab(s) orally once a day (24 Aug 2020 08:57)  Synthroid 88 mcg (0.088 mg) oral tablet: 1 tab(s) orally once a day (at bedtime) (24 Aug 2020 08:57)  traMADol 50 mg oral tablet: 1 tab(s) orally once a day- IN AM (24 Aug 2020 08:57)        LABS:                        13.4   12.54 )-----------( 265      ( 24 Aug 2020 14:08 )             40.8     08-24    139  |  109<H>  |  7   ----------------------------<  132<H>  4.3   |  24  |  0.70    Ca    8.8      24 Aug 2020 14:08          CAPILLARY BLOOD GLUCOSE      POCT Blood Glucose.: 98 mg/dL (24 Aug 2020 13:27)  POCT Blood Glucose.: 103 mg/dL (24 Aug 2020 08:51)            MEDICATIONS  (STANDING):  acetaminophen   Tablet .. 1000 milliGRAM(s) Oral every 6 hours  ascorbic acid 500 milliGRAM(s) Oral two times a day  celecoxib 200 milliGRAM(s) Oral two times a day  ferrous    sulfate 325 milliGRAM(s) Oral three times a day with meals  folic acid 1 milliGRAM(s) Oral daily  HYDROmorphone  Injectable 0.5 milliGRAM(s) IV Push once  lactated ringers. 1000 milliLiter(s) (100 mL/Hr) IV Continuous <Continuous>  levothyroxine 88 MICROGram(s) Oral daily  multivitamin 1 Tablet(s) Oral daily  polyethylene glycol 3350 17 Gram(s) Oral daily  rivaroxaban 10 milliGRAM(s) Oral daily    MEDICATIONS  (PRN):  aluminum hydroxide/magnesium hydroxide/simethicone Suspension 30 milliLiter(s) Oral four times a day PRN Indigestion  HYDROmorphone  Injectable 0.5 milliGRAM(s) IV Push every 4 hours PRN Severe Pain (7 - 10)  magnesium hydroxide Suspension 30 milliLiter(s) Oral daily PRN Constipation  melatonin 3 milliGRAM(s) Oral at bedtime PRN Sleep  ondansetron Injectable 4 milliGRAM(s) IV Push every 6 hours PRN Nausea and/or Vomiting  senna 2 Tablet(s) Oral at bedtime PRN Constipation  traMADol 50 milliGRAM(s) Oral every 6 hours PRN Moderate Pain (4 - 6)  traMADol 100 milliGRAM(s) Oral every 6 hours PRN Severe Pain (7 - 10)      REVIEW OF SYSTEMS:  CONSTITUTIONAL: No fever, weight loss, or fatigue  EYES: No eye pain, visual disturbances, or discharge  ENMT:  No difficulty hearing, tinnitus, vertigo; No sinus or throat pain  NECK: No pain or stiffness  RESPIRATORY: No cough, wheezing, chills or hemoptysis; No shortness of breath  CARDIOVASCULAR: No chest pain, palpitations, dizziness, or leg swelling  GASTROINTESTINAL: No abdominal or epigastric pain. No nausea, vomiting, or hematemesis; No diarrhea or constipation. No melena or hematochezia.  GENITOURINARY: No dysuria, frequency, hematuria, or incontinence  NEUROLOGICAL: No headaches, memory loss, loss of strength, numbness, or tremors  SKIN: No itching, burning, rashes, or lesions   LYMPH NODES: No enlarged glands  ENDOCRINE: No heat or cold intolerance; No hair loss  MUSCULOSKELETAL: chronic left knee pain  PSYCHIATRIC: No depression, anxiety, mood swings, or difficulty sleeping  HEME/LYMPH: No easy bruising, or bleeding gums  ALLERY AND IMMUNOLOGIC: No hives or eczema    RADIOLOGY & ADDITIONAL TESTS:    Imaging Personally Reviewed:  [ ] YES  [ ] NO    Consultant(s) Notes Reviewed:  [ ] YES  [ ] NO        PHYSICAL EXAM:  GENERAL: NAD, well-groomed, well-developed  HEAD:  Atraumatic, Normocephalic  EYES: EOMI, PERRLA, conjunctiva and sclera clear  ENMT: No tonsillar erythema, exudates, or enlargement; Moist mucous membranes, Good dentition, No lesions  NECK: Supple, No JVD, Normal thyroid  NERVOUS SYSTEM:  Alert & Oriented X3, Good concentration; Motor Strength 5/5 B/L upper and lower extremities; DTRs 2+ intact and symmetric  CHEST/LUNG: Clear to percussion bilaterally; No rales, rhonchi, wheezing, or rubs  HEART: Regular rate and rhythm; No murmurs, rubs, or gallops  ABDOMEN: Soft, Nontender, Nondistended; Bowel sounds present  EXTREMITIES:  2+ Peripheral Pulses, No clubbing, cyanosis, or edema  LYMPH: No lymphadenopathy noted  SKIN: No rashes or lesions    Care Discussed with Consultants/Other Providers [ ] YES  [ ] NO

## 2020-08-24 NOTE — DISCHARGE NOTE PROVIDER - NSDCMRMEDTOKEN_GEN_ALL_CORE_FT
Ambien 5 mg oral tablet: 1 tab(s) orally once a day (at bedtime)  CeleBREX 200 mg oral capsule: 2 tab(s) orally once a day  Synthroid 88 mcg (0.088 mg) oral tablet: 1 tab(s) orally once a day (at bedtime)  traMADol 50 mg oral tablet: 1 tab(s) orally once a day- IN AM 3:1 commode: s/p leftTKR, surgery on 8/24/20 Dx code M17.11 LlON 99 days  Ht 152, wt 68  acetaminophen 500 mg oral tablet: 2 tab(s) orally every 6 hours MDD:8  Ambien 5 mg oral tablet: 1 tab(s) orally once a day (at bedtime)  CeleBREX 200 mg oral capsule: 1 tab(s) orally 2 times a day MDD:2   oxyCODONE 5 mg oral tablet: 1 tab(s) orally every 6 hours, As Needed -Moderate Pain (4 - 6) MDD:4  rivaroxaban 10 mg oral tablet: 1 tab(s) orally once a day MDD:1  rolling walker: s/p leftTKR, surgery on 8/24/20 Dx code M17.11 LlON 99 days  Ht 152, wt 68  senna oral tablet: 2 tab(s) orally once a day (at bedtime), As needed, Constipation MDD:2  Synthroid 88 mcg (0.088 mg) oral tablet: 1 tab(s) orally once a day (at bedtime)

## 2020-08-25 DIAGNOSIS — R94.31 ABNORMAL ELECTROCARDIOGRAM [ECG] [EKG]: ICD-10-CM

## 2020-08-25 DIAGNOSIS — E03.9 HYPOTHYROIDISM, UNSPECIFIED: ICD-10-CM

## 2020-08-25 LAB
ANION GAP SERPL CALC-SCNC: 4 MMOL/L — LOW (ref 5–17)
BUN SERPL-MCNC: 7 MG/DL — SIGNIFICANT CHANGE UP (ref 7–23)
CALCIUM SERPL-MCNC: 9.2 MG/DL — SIGNIFICANT CHANGE UP (ref 8.5–10.1)
CHLORIDE SERPL-SCNC: 109 MMOL/L — HIGH (ref 96–108)
CO2 SERPL-SCNC: 28 MMOL/L — SIGNIFICANT CHANGE UP (ref 22–31)
CREAT SERPL-MCNC: 0.69 MG/DL — SIGNIFICANT CHANGE UP (ref 0.5–1.3)
GLUCOSE SERPL-MCNC: 112 MG/DL — HIGH (ref 70–99)
HCT VFR BLD CALC: 40 % — SIGNIFICANT CHANGE UP (ref 34.5–45)
HGB BLD-MCNC: 13 G/DL — SIGNIFICANT CHANGE UP (ref 11.5–15.5)
MCHC RBC-ENTMCNC: 30.4 PG — SIGNIFICANT CHANGE UP (ref 27–34)
MCHC RBC-ENTMCNC: 32.5 GM/DL — SIGNIFICANT CHANGE UP (ref 32–36)
MCV RBC AUTO: 93.5 FL — SIGNIFICANT CHANGE UP (ref 80–100)
NRBC # BLD: 0 /100 WBCS — SIGNIFICANT CHANGE UP (ref 0–0)
PLATELET # BLD AUTO: 254 K/UL — SIGNIFICANT CHANGE UP (ref 150–400)
POTASSIUM SERPL-MCNC: 4.4 MMOL/L — SIGNIFICANT CHANGE UP (ref 3.5–5.3)
POTASSIUM SERPL-SCNC: 4.4 MMOL/L — SIGNIFICANT CHANGE UP (ref 3.5–5.3)
RBC # BLD: 4.28 M/UL — SIGNIFICANT CHANGE UP (ref 3.8–5.2)
RBC # FLD: 12.8 % — SIGNIFICANT CHANGE UP (ref 10.3–14.5)
SODIUM SERPL-SCNC: 141 MMOL/L — SIGNIFICANT CHANGE UP (ref 135–145)
WBC # BLD: 15.59 K/UL — HIGH (ref 3.8–10.5)
WBC # FLD AUTO: 15.59 K/UL — HIGH (ref 3.8–10.5)

## 2020-08-25 RX ORDER — OXYCODONE HYDROCHLORIDE 5 MG/1
10 TABLET ORAL EVERY 4 HOURS
Refills: 0 | Status: DISCONTINUED | OUTPATIENT
Start: 2020-08-25 | End: 2020-08-26

## 2020-08-25 RX ORDER — ZOLPIDEM TARTRATE 10 MG/1
5 TABLET ORAL AT BEDTIME
Refills: 0 | Status: DISCONTINUED | OUTPATIENT
Start: 2020-08-25 | End: 2020-08-28

## 2020-08-25 RX ORDER — HYDROMORPHONE HYDROCHLORIDE 2 MG/ML
0.5 INJECTION INTRAMUSCULAR; INTRAVENOUS; SUBCUTANEOUS ONCE
Refills: 0 | Status: DISCONTINUED | OUTPATIENT
Start: 2020-08-25 | End: 2020-08-25

## 2020-08-25 RX ORDER — ACETAMINOPHEN 500 MG
1000 TABLET ORAL ONCE
Refills: 0 | Status: COMPLETED | OUTPATIENT
Start: 2020-08-25 | End: 2020-08-25

## 2020-08-25 RX ORDER — BENZOCAINE AND MENTHOL 5; 1 G/100ML; G/100ML
1 LIQUID ORAL EVERY 4 HOURS
Refills: 0 | Status: DISCONTINUED | OUTPATIENT
Start: 2020-08-25 | End: 2020-08-28

## 2020-08-25 RX ORDER — HYDROMORPHONE HYDROCHLORIDE 2 MG/ML
2 INJECTION INTRAMUSCULAR; INTRAVENOUS; SUBCUTANEOUS EVERY 4 HOURS
Refills: 0 | Status: DISCONTINUED | OUTPATIENT
Start: 2020-08-25 | End: 2020-08-25

## 2020-08-25 RX ORDER — TRAMADOL HYDROCHLORIDE 50 MG/1
50 TABLET ORAL EVERY 6 HOURS
Refills: 0 | Status: DISCONTINUED | OUTPATIENT
Start: 2020-08-25 | End: 2020-08-28

## 2020-08-25 RX ORDER — OXYCODONE HYDROCHLORIDE 5 MG/1
5 TABLET ORAL EVERY 4 HOURS
Refills: 0 | Status: DISCONTINUED | OUTPATIENT
Start: 2020-08-25 | End: 2020-08-26

## 2020-08-25 RX ADMIN — HYDROMORPHONE HYDROCHLORIDE 0.5 MILLIGRAM(S): 2 INJECTION INTRAMUSCULAR; INTRAVENOUS; SUBCUTANEOUS at 06:39

## 2020-08-25 RX ADMIN — CELECOXIB 200 MILLIGRAM(S): 200 CAPSULE ORAL at 18:07

## 2020-08-25 RX ADMIN — Medication 100 MILLIGRAM(S): at 03:23

## 2020-08-25 RX ADMIN — Medication 400 MILLIGRAM(S): at 04:39

## 2020-08-25 RX ADMIN — CELECOXIB 200 MILLIGRAM(S): 200 CAPSULE ORAL at 19:15

## 2020-08-25 RX ADMIN — RIVAROXABAN 10 MILLIGRAM(S): KIT at 11:17

## 2020-08-25 RX ADMIN — TRAMADOL HYDROCHLORIDE 100 MILLIGRAM(S): 50 TABLET ORAL at 08:32

## 2020-08-25 RX ADMIN — HYDROMORPHONE HYDROCHLORIDE 2 MILLIGRAM(S): 2 INJECTION INTRAMUSCULAR; INTRAVENOUS; SUBCUTANEOUS at 12:15

## 2020-08-25 RX ADMIN — TRAMADOL HYDROCHLORIDE 100 MILLIGRAM(S): 50 TABLET ORAL at 00:23

## 2020-08-25 RX ADMIN — OXYCODONE HYDROCHLORIDE 10 MILLIGRAM(S): 5 TABLET ORAL at 22:29

## 2020-08-25 RX ADMIN — HYDROMORPHONE HYDROCHLORIDE 0.5 MILLIGRAM(S): 2 INJECTION INTRAMUSCULAR; INTRAVENOUS; SUBCUTANEOUS at 07:00

## 2020-08-25 RX ADMIN — HYDROMORPHONE HYDROCHLORIDE 0.5 MILLIGRAM(S): 2 INJECTION INTRAMUSCULAR; INTRAVENOUS; SUBCUTANEOUS at 12:48

## 2020-08-25 RX ADMIN — CELECOXIB 200 MILLIGRAM(S): 200 CAPSULE ORAL at 05:33

## 2020-08-25 RX ADMIN — SODIUM CHLORIDE 100 MILLILITER(S): 9 INJECTION, SOLUTION INTRAVENOUS at 08:35

## 2020-08-25 RX ADMIN — TRAMADOL HYDROCHLORIDE 100 MILLIGRAM(S): 50 TABLET ORAL at 09:23

## 2020-08-25 RX ADMIN — OXYCODONE HYDROCHLORIDE 10 MILLIGRAM(S): 5 TABLET ORAL at 23:39

## 2020-08-25 RX ADMIN — Medication 1000 MILLIGRAM(S): at 10:50

## 2020-08-25 RX ADMIN — Medication 400 MILLIGRAM(S): at 10:18

## 2020-08-25 RX ADMIN — ZOLPIDEM TARTRATE 5 MILLIGRAM(S): 10 TABLET ORAL at 22:28

## 2020-08-25 RX ADMIN — Medication 500 MILLIGRAM(S): at 05:33

## 2020-08-25 RX ADMIN — Medication 88 MICROGRAM(S): at 05:33

## 2020-08-25 RX ADMIN — HYDROMORPHONE HYDROCHLORIDE 2 MILLIGRAM(S): 2 INJECTION INTRAMUSCULAR; INTRAVENOUS; SUBCUTANEOUS at 11:17

## 2020-08-25 RX ADMIN — Medication 1000 MILLIGRAM(S): at 05:00

## 2020-08-25 RX ADMIN — HYDROMORPHONE HYDROCHLORIDE 0.5 MILLIGRAM(S): 2 INJECTION INTRAMUSCULAR; INTRAVENOUS; SUBCUTANEOUS at 12:23

## 2020-08-25 NOTE — OCCUPATIONAL THERAPY INITIAL EVALUATION ADULT - TRANSFER TRAINING, PT EVAL
pt will complete sit to/from stands independently in prep for safe toilet transfers with appropriate AD/DME within 3-5 sessions

## 2020-08-25 NOTE — PROGRESS NOTE ADULT - SUBJECTIVE AND OBJECTIVE BOX
Patient is a 80y old  Female who presents with a chief complaint of Pt states "I am getting my LEFT Knee Replaced." (13 Aug 2020 10:35)  complains of post op discomfort  no other complaints presently    INTERVAL HPI/OVERNIGHT EVENTS:  T(C): 36.4 (08-25-20 @ 05:12), Max: 37.1 (08-24-20 @ 08:39)  HR: 91 (08-25-20 @ 05:12) (57 - 95)  BP: 130/82 (08-25-20 @ 05:12) (105/62 - 155/91)  RR: 18 (08-25-20 @ 05:12) (13 - 20)  SpO2: 95% (08-25-20 @ 05:12) (86% - 100%)  Wt(kg): --  I&O's Summary    24 Aug 2020 07:01  -  25 Aug 2020 06:57  --------------------------------------------------------  IN: 1970 mL / OUT: 1500 mL / NET: 470 mL        LABS:                        13.4   12.54 )-----------( 265      ( 24 Aug 2020 14:08 )             40.8     08-24    139  |  109<H>  |  7   ----------------------------<  132<H>  4.3   |  24  |  0.70    Ca    8.8      24 Aug 2020 14:08          CAPILLARY BLOOD GLUCOSE      POCT Blood Glucose.: 98 mg/dL (24 Aug 2020 13:27)  POCT Blood Glucose.: 103 mg/dL (24 Aug 2020 08:51)            MEDICATIONS  (STANDING):  acetaminophen   Tablet .. 1000 milliGRAM(s) Oral every 6 hours  ascorbic acid 500 milliGRAM(s) Oral two times a day  celecoxib 200 milliGRAM(s) Oral two times a day  ferrous    sulfate 325 milliGRAM(s) Oral three times a day with meals  folic acid 1 milliGRAM(s) Oral daily  lactated ringers. 1000 milliLiter(s) (100 mL/Hr) IV Continuous <Continuous>  levothyroxine 88 MICROGram(s) Oral daily  multivitamin 1 Tablet(s) Oral daily  polyethylene glycol 3350 17 Gram(s) Oral daily  rivaroxaban 10 milliGRAM(s) Oral daily    MEDICATIONS  (PRN):  aluminum hydroxide/magnesium hydroxide/simethicone Suspension 30 milliLiter(s) Oral four times a day PRN Indigestion  HYDROmorphone  Injectable 0.5 milliGRAM(s) IV Push every 4 hours PRN Severe Pain (7 - 10)  magnesium hydroxide Suspension 30 milliLiter(s) Oral daily PRN Constipation  melatonin 3 milliGRAM(s) Oral at bedtime PRN Sleep  ondansetron Injectable 4 milliGRAM(s) IV Push every 6 hours PRN Nausea and/or Vomiting  senna 2 Tablet(s) Oral at bedtime PRN Constipation  traMADol 50 milliGRAM(s) Oral every 6 hours PRN Moderate Pain (4 - 6)  traMADol 100 milliGRAM(s) Oral every 6 hours PRN Severe Pain (7 - 10)      REVIEW OF SYSTEMS:  CONSTITUTIONAL: No fever, weight loss, or fatigue  EYES: No eye pain, visual disturbances, or discharge  ENMT:  No difficulty hearing, tinnitus, vertigo; No sinus or throat pain  NECK: No pain or stiffness  RESPIRATORY: No cough, wheezing, chills or hemoptysis; No shortness of breath  CARDIOVASCULAR: No chest pain, palpitations, dizziness, or leg swelling  GASTROINTESTINAL: No abdominal or epigastric pain. No nausea, vomiting, or hematemesis; No diarrhea or constipation. No melena or hematochezia.  GENITOURINARY: No dysuria, frequency, hematuria, or incontinence  NEUROLOGICAL: No headaches, memory loss, loss of strength, numbness, or tremors  SKIN: No itching, burning, rashes, or lesions   LYMPH NODES: No enlarged glands  ENDOCRINE: No heat or cold intolerance; No hair loss  MUSCULOSKELETAL: chronic left knee pain  PSYCHIATRIC: No depression, anxiety, mood swings, or difficulty sleeping  HEME/LYMPH: No easy bruising, or bleeding gums  ALLERY AND IMMUNOLOGIC: No hives or eczema    RADIOLOGY & ADDITIONAL TESTS:    Imaging Personally Reviewed:  [ ] YES  [ ] NO    Consultant(s) Notes Reviewed:  [ ] YES  [ ] NO    PHYSICAL EXAM:  GENERAL: NAD, well-groomed, well-developed  HEAD:  Atraumatic, Normocephalic  EYES: EOMI, PERRLA, conjunctiva and sclera clear  ENMT: No tonsillar erythema, exudates, or enlargement; Moist mucous membranes, Good dentition, No lesions  NECK: Supple, No JVD, Normal thyroid  NERVOUS SYSTEM:  Alert & Oriented X3, Good concentration; Motor Strength 5/5 B/L upper and lower extremities; DTRs 2+ intact and symmetric  CHEST/LUNG: Clear to percussion bilaterally; No rales, rhonchi, wheezing, or rubs  HEART: Regular rate and rhythm; No murmurs, rubs, or gallops  ABDOMEN: Soft, Nontender, Nondistended; Bowel sounds present  EXTREMITIES:  2+ Peripheral Pulses, No clubbing, cyanosis, or edema  LYMPH: No lymphadenopathy noted  SKIN: No rashes or lesions    Care Discussed with Consultants/Other Providers [ ] YES  [ ] NO

## 2020-08-25 NOTE — OCCUPATIONAL THERAPY INITIAL EVALUATION ADULT - PERTINENT HX OF CURRENT PROBLEM, REHAB EVAL
Pt is a 79 y/o female s/p Left TKR 8/24/2020. Pt with h/o prior partial right knee replacement January 2018 which was then followed by right total knee revision/replacement May 2019.

## 2020-08-25 NOTE — OCCUPATIONAL THERAPY INITIAL EVALUATION ADULT - GENERAL OBSERVATIONS, REHAB EVAL
Pt received supine in bed (+) IV, (+) SCDs, (+) external catheter; c/o left knee pain but was premedicated and agreed to participate with OT with encouragement.

## 2020-08-25 NOTE — OCCUPATIONAL THERAPY INITIAL EVALUATION ADULT - IADL RETRAINING, OT EVAL
Patient will increase standing tolerance to 3-5 minutes with RW for ADLs in bathroom within 3-5 sessions

## 2020-08-25 NOTE — OCCUPATIONAL THERAPY INITIAL EVALUATION ADULT - ADDITIONAL COMMENTS
Pt lives with her  in a 1-level home with 3+3 steps to enter (+) rails. Bathroom has a stall shower with grab bars, raised toilet. PTA pt was independent with ADLs and functional mobility without AD. Pt reports she has a RW.

## 2020-08-25 NOTE — PROGRESS NOTE ADULT - SUBJECTIVE AND OBJECTIVE BOX
Orthopaedic PA    Procedure: s/p L TKR  Surgeon: Radha    80y Female seen and examined at bedside. Patient c/o 8/10 left knee pain. Denies chest pain, shortness of breath, palpitations, nausea, vomiting, dizziness, fevers, chills    PE:  Vital Signs Last 24 Hrs  T(C): 36.4 (25 Aug 2020 05:12), Max: 36.9 (24 Aug 2020 13:25)  T(F): 97.6 (25 Aug 2020 05:12), Max: 98.4 (24 Aug 2020 13:25)  HR: 71 (25 Aug 2020 07:04) (57 - 95)  BP: 128/74 (25 Aug 2020 07:04) (105/62 - 144/45)  BP(mean): --  RR: 15 (25 Aug 2020 07:04) (14 - 20)  SpO2: 91% (25 Aug 2020 07:04) (86% - 100%)    General:  A + O x 3 in NAD  Knee: L Aquacel CDI.   NSLT L2-S1.   5/5 DF/PF/EHL/FHL  2+ DP/2+ PT  No calf tenderness to palpation B/L.       LABS:                        13.0   15.59 )-----------( 254      ( 25 Aug 2020 08:28 )             40.0       08-25    141  |  109<H>  |  7   ----------------------------<  112<H>  4.4   |  28  |  0.69    Ca    9.2      25 Aug 2020 08:28      A/P: 80y Female stable POD#1 s/p  L TKR     - Pain control, supportive care  - Incentive spirometer  - DVT ppx: SCD / Chemical with Xarelto  - Ambulation as tolerated  - PT, OT  - Labs in AM  - Discharge planning

## 2020-08-26 LAB
HCT VFR BLD CALC: 37.4 % — SIGNIFICANT CHANGE UP (ref 34.5–45)
HGB BLD-MCNC: 11.9 G/DL — SIGNIFICANT CHANGE UP (ref 11.5–15.5)
MCHC RBC-ENTMCNC: 29.8 PG — SIGNIFICANT CHANGE UP (ref 27–34)
MCHC RBC-ENTMCNC: 31.8 GM/DL — LOW (ref 32–36)
MCV RBC AUTO: 93.7 FL — SIGNIFICANT CHANGE UP (ref 80–100)
NRBC # BLD: 0 /100 WBCS — SIGNIFICANT CHANGE UP (ref 0–0)
PLATELET # BLD AUTO: 221 K/UL — SIGNIFICANT CHANGE UP (ref 150–400)
RBC # BLD: 3.99 M/UL — SIGNIFICANT CHANGE UP (ref 3.8–5.2)
RBC # FLD: 13 % — SIGNIFICANT CHANGE UP (ref 10.3–14.5)
SURGICAL PATHOLOGY STUDY: SIGNIFICANT CHANGE UP
WBC # BLD: 9.7 K/UL — SIGNIFICANT CHANGE UP (ref 3.8–10.5)
WBC # FLD AUTO: 9.7 K/UL — SIGNIFICANT CHANGE UP (ref 3.8–10.5)

## 2020-08-26 RX ORDER — RIVAROXABAN 15 MG-20MG
1 KIT ORAL
Qty: 10 | Refills: 0
Start: 2020-08-26 | End: 2020-09-04

## 2020-08-26 RX ORDER — ACETAMINOPHEN 500 MG
2 TABLET ORAL
Qty: 0 | Refills: 0 | DISCHARGE
Start: 2020-08-26

## 2020-08-26 RX ORDER — SENNA PLUS 8.6 MG/1
2 TABLET ORAL
Qty: 10 | Refills: 0
Start: 2020-08-26 | End: 2020-08-30

## 2020-08-26 RX ORDER — HYDROMORPHONE HYDROCHLORIDE 2 MG/ML
2 INJECTION INTRAMUSCULAR; INTRAVENOUS; SUBCUTANEOUS EVERY 4 HOURS
Refills: 0 | Status: DISCONTINUED | OUTPATIENT
Start: 2020-08-26 | End: 2020-08-28

## 2020-08-26 RX ORDER — TRAMADOL HYDROCHLORIDE 50 MG/1
1 TABLET ORAL
Qty: 0 | Refills: 0 | DISCHARGE

## 2020-08-26 RX ORDER — OXYCODONE HYDROCHLORIDE 5 MG/1
1 TABLET ORAL
Qty: 20 | Refills: 0
Start: 2020-08-26 | End: 2020-08-30

## 2020-08-26 RX ORDER — CELECOXIB 200 MG/1
2 CAPSULE ORAL
Qty: 0 | Refills: 0 | DISCHARGE

## 2020-08-26 RX ORDER — ACETAMINOPHEN 500 MG
2 TABLET ORAL
Qty: 56 | Refills: 0
Start: 2020-08-26 | End: 2020-09-01

## 2020-08-26 RX ORDER — CELECOXIB 200 MG/1
1 CAPSULE ORAL
Qty: 14 | Refills: 0
Start: 2020-08-26 | End: 2020-09-01

## 2020-08-26 RX ORDER — HYDROMORPHONE HYDROCHLORIDE 2 MG/ML
4 INJECTION INTRAMUSCULAR; INTRAVENOUS; SUBCUTANEOUS EVERY 4 HOURS
Refills: 0 | Status: DISCONTINUED | OUTPATIENT
Start: 2020-08-26 | End: 2020-08-28

## 2020-08-26 RX ADMIN — Medication 500 MILLIGRAM(S): at 17:09

## 2020-08-26 RX ADMIN — OXYCODONE HYDROCHLORIDE 10 MILLIGRAM(S): 5 TABLET ORAL at 11:39

## 2020-08-26 RX ADMIN — HYDROMORPHONE HYDROCHLORIDE 0.5 MILLIGRAM(S): 2 INJECTION INTRAMUSCULAR; INTRAVENOUS; SUBCUTANEOUS at 10:00

## 2020-08-26 RX ADMIN — CELECOXIB 200 MILLIGRAM(S): 200 CAPSULE ORAL at 18:09

## 2020-08-26 RX ADMIN — Medication 1000 MILLIGRAM(S): at 18:23

## 2020-08-26 RX ADMIN — HYDROMORPHONE HYDROCHLORIDE 0.5 MILLIGRAM(S): 2 INJECTION INTRAMUSCULAR; INTRAVENOUS; SUBCUTANEOUS at 09:30

## 2020-08-26 RX ADMIN — HYDROMORPHONE HYDROCHLORIDE 2 MILLIGRAM(S): 2 INJECTION INTRAMUSCULAR; INTRAVENOUS; SUBCUTANEOUS at 21:53

## 2020-08-26 RX ADMIN — HYDROMORPHONE HYDROCHLORIDE 2 MILLIGRAM(S): 2 INJECTION INTRAMUSCULAR; INTRAVENOUS; SUBCUTANEOUS at 16:37

## 2020-08-26 RX ADMIN — HYDROMORPHONE HYDROCHLORIDE 2 MILLIGRAM(S): 2 INJECTION INTRAMUSCULAR; INTRAVENOUS; SUBCUTANEOUS at 15:37

## 2020-08-26 RX ADMIN — Medication 1000 MILLIGRAM(S): at 14:33

## 2020-08-26 RX ADMIN — RIVAROXABAN 10 MILLIGRAM(S): KIT at 11:54

## 2020-08-26 RX ADMIN — CELECOXIB 200 MILLIGRAM(S): 200 CAPSULE ORAL at 06:13

## 2020-08-26 RX ADMIN — HYDROMORPHONE HYDROCHLORIDE 2 MILLIGRAM(S): 2 INJECTION INTRAMUSCULAR; INTRAVENOUS; SUBCUTANEOUS at 20:49

## 2020-08-26 RX ADMIN — CELECOXIB 200 MILLIGRAM(S): 200 CAPSULE ORAL at 05:13

## 2020-08-26 RX ADMIN — OXYCODONE HYDROCHLORIDE 10 MILLIGRAM(S): 5 TABLET ORAL at 05:14

## 2020-08-26 RX ADMIN — Medication 1000 MILLIGRAM(S): at 13:33

## 2020-08-26 RX ADMIN — Medication 325 MILLIGRAM(S): at 17:09

## 2020-08-26 RX ADMIN — ZOLPIDEM TARTRATE 5 MILLIGRAM(S): 10 TABLET ORAL at 21:55

## 2020-08-26 RX ADMIN — OXYCODONE HYDROCHLORIDE 10 MILLIGRAM(S): 5 TABLET ORAL at 06:13

## 2020-08-26 RX ADMIN — OXYCODONE HYDROCHLORIDE 10 MILLIGRAM(S): 5 TABLET ORAL at 10:39

## 2020-08-26 RX ADMIN — Medication 88 MICROGRAM(S): at 05:14

## 2020-08-26 RX ADMIN — CELECOXIB 200 MILLIGRAM(S): 200 CAPSULE ORAL at 17:09

## 2020-08-26 RX ADMIN — Medication 325 MILLIGRAM(S): at 09:33

## 2020-08-26 RX ADMIN — Medication 500 MILLIGRAM(S): at 05:13

## 2020-08-26 NOTE — PROGRESS NOTE ADULT - SUBJECTIVE AND OBJECTIVE BOX
Patient is a 80y old  Female who presents with a chief complaint of Pt states "I am getting my LEFT Knee Replaced." (13 Aug 2020 10:35)  complains of post op discomfort  no other complaints presently    Vital Signs Last 24 Hrs  T(C): 36.4 (26 Aug 2020 05:04), Max: 36.6 (25 Aug 2020 15:50)  T(F): 97.5 (26 Aug 2020 05:04), Max: 97.9 (25 Aug 2020 15:50)  HR: 87 (26 Aug 2020 05:04) (70 - 87)  BP: 118/63 (26 Aug 2020 05:04) (103/57 - 126/71)  BP(mean): --  RR: 18 (26 Aug 2020 05:04) (18 - 18)  SpO2: 90% (26 Aug 2020 05:04) (90% - 93%)    08-25    141  |  109<H>  |  7   ----------------------------<  112<H>  4.4   |  28  |  0.69    Ca    9.2      25 Aug 2020 08:28                            11.9   9.70  )-----------( 221      ( 26 Aug 2020 07:25 )             37.4       CAPILLARY BLOOD GLUCOSE                  acetaminophen   Tablet .. 1000 milliGRAM(s) Oral every 6 hours  aluminum hydroxide/magnesium hydroxide/simethicone Suspension 30 milliLiter(s) Oral four times a day PRN  ascorbic acid 500 milliGRAM(s) Oral two times a day  benzocaine 15 mG/menthol 3.6 mG (Sugar-Free) Lozenge 1 Lozenge Oral every 4 hours PRN  bisacodyl Suppository 10 milliGRAM(s) Rectal daily PRN  celecoxib 200 milliGRAM(s) Oral two times a day  ferrous    sulfate 325 milliGRAM(s) Oral three times a day with meals  folic acid 1 milliGRAM(s) Oral daily  HYDROmorphone   Tablet 2 milliGRAM(s) Oral every 4 hours PRN  HYDROmorphone   Tablet 4 milliGRAM(s) Oral every 4 hours PRN  HYDROmorphone  Injectable 0.5 milliGRAM(s) IV Push every 4 hours PRN  levothyroxine 88 MICROGram(s) Oral daily  magnesium hydroxide Suspension 30 milliLiter(s) Oral daily PRN  melatonin 3 milliGRAM(s) Oral at bedtime PRN  multivitamin 1 Tablet(s) Oral daily  ondansetron Injectable 4 milliGRAM(s) IV Push every 6 hours PRN  polyethylene glycol 3350 17 Gram(s) Oral daily  rivaroxaban 10 milliGRAM(s) Oral daily  senna 2 Tablet(s) Oral at bedtime PRN  traMADol 50 milliGRAM(s) Oral every 6 hours PRN  zolpidem 5 milliGRAM(s) Oral at bedtime PRN      REVIEW OF SYSTEMS:  CONSTITUTIONAL: No fever, weight loss, or fatigue  EYES: No eye pain, visual disturbances, or discharge  ENMT:  No difficulty hearing, tinnitus, vertigo; No sinus or throat pain  NECK: No pain or stiffness  RESPIRATORY: No cough, wheezing, chills or hemoptysis; No shortness of breath  CARDIOVASCULAR: No chest pain, palpitations, dizziness, or leg swelling  GASTROINTESTINAL: No abdominal or epigastric pain. No nausea, vomiting, or hematemesis; No diarrhea or constipation. No melena or hematochezia.  GENITOURINARY: No dysuria, frequency, hematuria, or incontinence  NEUROLOGICAL: No headaches, memory loss, loss of strength, numbness, or tremors  SKIN: No itching, burning, rashes, or lesions   LYMPH NODES: No enlarged glands  ENDOCRINE: No heat or cold intolerance; No hair loss  MUSCULOSKELETAL: chronic left knee pain  PSYCHIATRIC: No depression, anxiety, mood swings, or difficulty sleeping  HEME/LYMPH: No easy bruising, or bleeding gums  ALLERY AND IMMUNOLOGIC: No hives or eczema    RADIOLOGY & ADDITIONAL TESTS:    Imaging Personally Reviewed:  [ ] YES  [ ] NO    Consultant(s) Notes Reviewed:  [ ] YES  [ ] NO    PHYSICAL EXAM:  GENERAL: NAD, well-groomed, well-developed  HEAD:  Atraumatic, Normocephalic  EYES: EOMI, PERRLA, conjunctiva and sclera clear  ENMT: No tonsillar erythema, exudates, or enlargement; Moist mucous membranes, Good dentition, No lesions  NECK: Supple, No JVD, Normal thyroid  NERVOUS SYSTEM:  Alert & Oriented X3, Good concentration; Motor Strength 5/5 B/L upper and lower extremities; DTRs 2+ intact and symmetric  CHEST/LUNG: Clear to percussion bilaterally; No rales, rhonchi, wheezing, or rubs  HEART: Regular rate and rhythm; No murmurs, rubs, or gallops  ABDOMEN: Soft, Nontender, Nondistended; Bowel sounds present  EXTREMITIES:  2+ Peripheral Pulses, No clubbing, cyanosis, or edema  LYMPH: No lymphadenopathy noted  SKIN: No rashes or lesions    Care Discussed with Consultants/Other Providers [ ] YES  [ ] NO Patient is a 80y old  Female who presents with a chief complaint of Pt states "I am getting my LEFT Knee Replaced." (13 Aug 2020 10:35)  complains of post op discomfort  no other complaints presently    Vital Signs Last 24 Hrs  T(C): 36.4 (26 Aug 2020 05:04), Max: 36.6 (25 Aug 2020 15:50)  T(F): 97.5 (26 Aug 2020 05:04), Max: 97.9 (25 Aug 2020 15:50)  HR: 87 (26 Aug 2020 05:04) (70 - 87)  BP: 118/63 (26 Aug 2020 05:04) (103/57 - 126/71)  BP(mean): --  RR: 18 (26 Aug 2020 05:04) (18 - 18)  SpO2: 90% (26 Aug 2020 05:04) (90% - 93%)    08-25    141  |  109<H>  |  7   ----------------------------<  112<H>  4.4   |  28  |  0.69    Ca    9.2      25 Aug 2020 08:28                            11.9   9.70  )-----------( 221      ( 26 Aug 2020 07:25 )             37.4       CAPILLARY BLOOD GLUCOSE                  acetaminophen   Tablet .. 1000 milliGRAM(s) Oral every 6 hours  aluminum hydroxide/magnesium hydroxide/simethicone Suspension 30 milliLiter(s) Oral four times a day PRN  ascorbic acid 500 milliGRAM(s) Oral two times a day  benzocaine 15 mG/menthol 3.6 mG (Sugar-Free) Lozenge 1 Lozenge Oral every 4 hours PRN  bisacodyl Suppository 10 milliGRAM(s) Rectal daily PRN  celecoxib 200 milliGRAM(s) Oral two times a day  ferrous    sulfate 325 milliGRAM(s) Oral three times a day with meals  folic acid 1 milliGRAM(s) Oral daily  HYDROmorphone   Tablet 2 milliGRAM(s) Oral every 4 hours PRN  HYDROmorphone   Tablet 4 milliGRAM(s) Oral every 4 hours PRN  HYDROmorphone  Injectable 0.5 milliGRAM(s) IV Push every 4 hours PRN  levothyroxine 88 MICROGram(s) Oral daily  magnesium hydroxide Suspension 30 milliLiter(s) Oral daily PRN  melatonin 3 milliGRAM(s) Oral at bedtime PRN  multivitamin 1 Tablet(s) Oral daily  ondansetron Injectable 4 milliGRAM(s) IV Push every 6 hours PRN  polyethylene glycol 3350 17 Gram(s) Oral daily  rivaroxaban 10 milliGRAM(s) Oral daily  senna 2 Tablet(s) Oral at bedtime PRN  traMADol 50 milliGRAM(s) Oral every 6 hours PRN  zolpidem 5 milliGRAM(s) Oral at bedtime PRN      REVIEW OF SYSTEMS:  CONSTITUTIONAL: No fever, weight loss, or fatigue  EYES: No eye pain, visual disturbances, or discharge  ENMT:  No difficulty hearing, tinnitus, vertigo; No sinus or throat pain  NECK: No pain or stiffness  RESPIRATORY: No cough, wheezing, chills or hemoptysis; No shortness of breath  CARDIOVASCULAR: No chest pain, palpitations, dizziness, or leg swelling  GASTROINTESTINAL: No abdominal or epigastric pain. No nausea, vomiting, or hematemesis; No diarrhea or constipation. No melena or hematochezia.  GENITOURINARY: No dysuria, frequency, hematuria, or incontinence  NEUROLOGICAL: No headaches, memory loss, loss of strength, numbness, or tremors  SKIN: No itching, burning, rashes, or lesions   LYMPH NODES: No enlarged glands  ENDOCRINE: No heat or cold intolerance; No hair loss  MUSCULOSKELETAL: chronic left knee pain  PSYCHIATRIC: No depression, anxiety, mood swings, or difficulty sleeping  HEME/LYMPH: No easy bruising, or bleeding gums  ALLERY AND IMMUNOLOGIC: No hives or eczema      PHYSICAL EXAM:  GENERAL: NAD, well-groomed, well-developed  HEAD:  Atraumatic, Normocephalic  EYES: EOMI, PERRLA, conjunctiva and sclera clear  ENMT: No tonsillar erythema, exudates, or enlargement; Moist mucous membranes, Good dentition, No lesions  NECK: Supple, No JVD, Normal thyroid  NERVOUS SYSTEM:  Alert & Oriented X3, Good concentration; Motor Strength 5/5 B/L upper and lower extremities; DTRs 2+ intact and symmetric  CHEST/LUNG: Clear to percussion bilaterally; No rales, rhonchi, wheezing, or rubs  HEART: Regular rate and rhythm; No murmurs, rubs, or gallops  ABDOMEN: Soft, Nontender, Nondistended; Bowel sounds present  EXTREMITIES:  2+ Peripheral Pulses, No clubbing, cyanosis, or edema  LYMPH: No lymphadenopathy noted  SKIN: No rashes or lesions          Advanced care planning discussed with patient/family. Patient's health status was discussed. All appropriate changes have been made regarding patient's end-of-life care. Advanced care planning forms reviewed/discussed/completed.  20 minutes spent.

## 2020-08-26 NOTE — PROGRESS NOTE ADULT - SUBJECTIVE AND OBJECTIVE BOX
Orthopaedic PA    Procedure: s/p L TKR  Surgeon: Radha    80y Female seen and examined. States she is feeling "miserable" with 10/10 pain. As per RN, patient slept through the night without complaints. Denies chest pain, shortness of breath, palpitations, nausea, vomiting, dizziness, fevers, chills    PE:  Vital Signs Last 24 Hrs  T(C): 36.4 (26 Aug 2020 05:04), Max: 36.6 (25 Aug 2020 15:50)  T(F): 97.5 (26 Aug 2020 05:04), Max: 97.9 (25 Aug 2020 15:50)  HR: 87 (26 Aug 2020 05:04) (70 - 87)  BP: 118/63 (26 Aug 2020 05:04) (103/57 - 156/80)  BP(mean): --  RR: 18 (26 Aug 2020 05:04) (16 - 18)  SpO2: 90% (26 Aug 2020 05:04) (90% - 97%)    General:  A + O x 3 in NAD  Knee: L Aquacel CDI.   NSLT L2-S1.   5/5 DF/PF/EHL/FHL  2+ DP/2+ PT  No calf tenderness to palpation B/L.     LABS:                      11.9   9.70  )-----------( 221      ( 26 Aug 2020 07:25 )             37.4       08-25    141  |  109<H>  |  7   ----------------------------<  112<H>  4.4   |  28  |  0.69    Ca    9.2      25 Aug 2020 08:28      A/P: 80y Female stable POD#2 s/p L TKR     - Pain control, supportive care  - Incentive spirometer  - DVT ppx: SCD / Chemical with Xarelto  - Ambulation as tolerated  - PT, OT  - F/U AM labs  - Discharge planning to [facility]

## 2020-08-27 VITALS
TEMPERATURE: 98 F | RESPIRATION RATE: 18 BRPM | SYSTOLIC BLOOD PRESSURE: 114 MMHG | OXYGEN SATURATION: 92 % | HEART RATE: 90 BPM | DIASTOLIC BLOOD PRESSURE: 66 MMHG

## 2020-08-27 RX ADMIN — HYDROMORPHONE HYDROCHLORIDE 4 MILLIGRAM(S): 2 INJECTION INTRAMUSCULAR; INTRAVENOUS; SUBCUTANEOUS at 09:37

## 2020-08-27 RX ADMIN — HYDROMORPHONE HYDROCHLORIDE 0.5 MILLIGRAM(S): 2 INJECTION INTRAMUSCULAR; INTRAVENOUS; SUBCUTANEOUS at 13:12

## 2020-08-27 RX ADMIN — Medication 88 MICROGRAM(S): at 05:27

## 2020-08-27 RX ADMIN — HYDROMORPHONE HYDROCHLORIDE 0.5 MILLIGRAM(S): 2 INJECTION INTRAMUSCULAR; INTRAVENOUS; SUBCUTANEOUS at 14:03

## 2020-08-27 RX ADMIN — Medication 500 MILLIGRAM(S): at 05:27

## 2020-08-27 RX ADMIN — Medication 3 MILLIGRAM(S): at 21:19

## 2020-08-27 RX ADMIN — HYDROMORPHONE HYDROCHLORIDE 4 MILLIGRAM(S): 2 INJECTION INTRAMUSCULAR; INTRAVENOUS; SUBCUTANEOUS at 10:45

## 2020-08-27 RX ADMIN — RIVAROXABAN 10 MILLIGRAM(S): KIT at 13:19

## 2020-08-27 RX ADMIN — HYDROMORPHONE HYDROCHLORIDE 4 MILLIGRAM(S): 2 INJECTION INTRAMUSCULAR; INTRAVENOUS; SUBCUTANEOUS at 16:04

## 2020-08-27 RX ADMIN — CELECOXIB 200 MILLIGRAM(S): 200 CAPSULE ORAL at 05:27

## 2020-08-27 RX ADMIN — CELECOXIB 200 MILLIGRAM(S): 200 CAPSULE ORAL at 17:42

## 2020-08-27 RX ADMIN — Medication 1000 MILLIGRAM(S): at 05:27

## 2020-08-27 NOTE — PROGRESS NOTE ADULT - SUBJECTIVE AND OBJECTIVE BOX
KOMAL GILLESPIE      80y   female  MRN-[id number]    ORTHOPEDIC SURGERY / DR. CLEMENT    POD # 3    MEDICATIONS  (STANDING):  acetaminophen   Tablet .. 1000 milliGRAM(s) Oral every 6 hours  ascorbic acid 500 milliGRAM(s) Oral two times a day  celecoxib 200 milliGRAM(s) Oral two times a day  ferrous    sulfate 325 milliGRAM(s) Oral three times a day with meals  folic acid 1 milliGRAM(s) Oral daily  levothyroxine 88 MICROGram(s) Oral daily  multivitamin 1 Tablet(s) Oral daily  polyethylene glycol 3350 17 Gram(s) Oral daily  rivaroxaban 10 milliGRAM(s) Oral daily    MEDICATIONS  (PRN):  aluminum hydroxide/magnesium hydroxide/simethicone Suspension 30 milliLiter(s) Oral four times a day PRN Indigestion  benzocaine 15 mG/menthol 3.6 mG (Sugar-Free) Lozenge 1 Lozenge Oral every 4 hours PRN Sore Throat  bisacodyl Suppository 10 milliGRAM(s) Rectal daily PRN If no bowel movement by postoperative day #2  HYDROmorphone   Tablet 2 milliGRAM(s) Oral every 4 hours PRN Moderate Pain (4 - 6)  HYDROmorphone   Tablet 4 milliGRAM(s) Oral every 4 hours PRN Severe Pain (7 - 10)  HYDROmorphone  Injectable 0.5 milliGRAM(s) IV Push every 4 hours PRN Severe Pain (7 - 10)  magnesium hydroxide Suspension 30 milliLiter(s) Oral daily PRN Constipation  melatonin 3 milliGRAM(s) Oral at bedtime PRN Sleep  ondansetron Injectable 4 milliGRAM(s) IV Push every 6 hours PRN Nausea and/or Vomiting  senna 2 Tablet(s) Oral at bedtime PRN Constipation  traMADol 50 milliGRAM(s) Oral every 6 hours PRN Mild Pain (1 - 3)  zolpidem 5 milliGRAM(s) Oral at bedtime PRN Insomnia      Vital Signs Last 24 Hrs  T(C): 36.6 (27 Aug 2020 04:59), Max: 36.6 (26 Aug 2020 14:02)  T(F): 97.8 (27 Aug 2020 04:59), Max: 97.8 (26 Aug 2020 14:02)  HR: 79 (27 Aug 2020 04:59) (79 - 86)  BP: 118/57 (27 Aug 2020 04:59) (116/67 - 138/61)  BP(mean): --  RR: 18 (27 Aug 2020 04:59) (18 - 18)  SpO2: 92% (27 Aug 2020 04:59) (92% - 95%)    LEFT KNEE :    WOUND DRY AND INTACT  SOME EDEMA  GOOD MOTOR TO LEFT LOWER EXTREMITY  NEURO-VASCULAR STATUS INTACT  NO CALF TENDERNESS    Hemoglobin: 11.9 (08-26 @ 07:25)  Hemoglobin: 13.0 (08-25 @ 08:28)  Hemoglobin: 13.4 (08-24 @ 14:08)    Hematocrit: 37.4 (08-26 @ 07:25)  Hematocrit: 40.0 (08-25 @ 08:28)  Hematocrit: 40.8 (08-24 @ 14:08)    08-25    141  |  109<H>  |  7   ----------------------------<  112<H>  4.4   |  28  |  0.69    Ca    9.2      25 Aug 2020 08:28      CHEMISTRY TRENDS:    141  4.4  109  28  7  0.69  112  (08-25-20 @ 08:28)      139  4.3  109  24  7  0.70  132  (08-24-20 @ 14:08)                                ASSESSMENT &  PLAN:  POD # 3 S/P LEFT TOTAL KNEE REPLACEMENT    WEIGHT  BEARING AS TOLERATED, OOB AND AMBULATE, PHYSICAL THERAPY   DVT PROPHYLAXIS XARELTO 10 MG DAILY   DISCHARGE PLANNING TO HOME WITH HOME CARE TOMORROW   NEW AQUACEL DRESSING APPLIED

## 2020-08-27 NOTE — PROGRESS NOTE ADULT - SUBJECTIVE AND OBJECTIVE BOX
Patient is a 80y old  Female who presents with a chief complaint of Pt states "I am getting my LEFT Knee Replaced." (13 Aug 2020 10:35)  complains of post op discomfort  no other complaints presently    Vital Signs Last 24 Hrs  T(C): 36.7 (27 Aug 2020 12:55), Max: 36.7 (27 Aug 2020 12:55)  T(F): 98 (27 Aug 2020 12:55), Max: 98 (27 Aug 2020 12:55)  HR: 107 (27 Aug 2020 12:55) (79 - 107)  BP: 134/76 (27 Aug 2020 12:55) (116/67 - 138/61)  BP(mean): --  RR: 18 (27 Aug 2020 12:55) (18 - 18)  SpO2: 92% (27 Aug 2020 12:55) (92% - 95%)                                11.9   9.70  )-----------( 221      ( 26 Aug 2020 07:25 )             37.4       CAPILLARY BLOOD GLUCOSE                  acetaminophen   Tablet .. 1000 milliGRAM(s) Oral every 6 hours  aluminum hydroxide/magnesium hydroxide/simethicone Suspension 30 milliLiter(s) Oral four times a day PRN  ascorbic acid 500 milliGRAM(s) Oral two times a day  benzocaine 15 mG/menthol 3.6 mG (Sugar-Free) Lozenge 1 Lozenge Oral every 4 hours PRN  bisacodyl Suppository 10 milliGRAM(s) Rectal daily PRN  celecoxib 200 milliGRAM(s) Oral two times a day  ferrous    sulfate 325 milliGRAM(s) Oral three times a day with meals  folic acid 1 milliGRAM(s) Oral daily  HYDROmorphone   Tablet 2 milliGRAM(s) Oral every 4 hours PRN  HYDROmorphone   Tablet 4 milliGRAM(s) Oral every 4 hours PRN  HYDROmorphone  Injectable 0.5 milliGRAM(s) IV Push every 4 hours PRN  levothyroxine 88 MICROGram(s) Oral daily  magnesium hydroxide Suspension 30 milliLiter(s) Oral daily PRN  melatonin 3 milliGRAM(s) Oral at bedtime PRN  multivitamin 1 Tablet(s) Oral daily  ondansetron Injectable 4 milliGRAM(s) IV Push every 6 hours PRN  polyethylene glycol 3350 17 Gram(s) Oral daily  rivaroxaban 10 milliGRAM(s) Oral daily  senna 2 Tablet(s) Oral at bedtime PRN  traMADol 50 milliGRAM(s) Oral every 6 hours PRN  zolpidem 5 milliGRAM(s) Oral at bedtime PRN      REVIEW OF SYSTEMS:  CONSTITUTIONAL: No fever, weight loss, or fatigue  EYES: No eye pain, visual disturbances, or discharge  ENMT:  No difficulty hearing, tinnitus, vertigo; No sinus or throat pain  NECK: No pain or stiffness  RESPIRATORY: No cough, wheezing, chills or hemoptysis; No shortness of breath  CARDIOVASCULAR: No chest pain, palpitations, dizziness, or leg swelling  GASTROINTESTINAL: No abdominal or epigastric pain. No nausea, vomiting, or hematemesis; No diarrhea or constipation. No melena or hematochezia.  GENITOURINARY: No dysuria, frequency, hematuria, or incontinence  NEUROLOGICAL: No headaches, memory loss, loss of strength, numbness, or tremors  SKIN: No itching, burning, rashes, or lesions   LYMPH NODES: No enlarged glands  ENDOCRINE: No heat or cold intolerance; No hair loss  MUSCULOSKELETAL: chronic left knee pain  PSYCHIATRIC: No depression, anxiety, mood swings, or difficulty sleeping  HEME/LYMPH: No easy bruising, or bleeding gums  ALLERY AND IMMUNOLOGIC: No hives or eczema      PHYSICAL EXAM:  GENERAL: NAD, well-groomed, well-developed  HEAD:  Atraumatic, Normocephalic  EYES: EOMI, PERRLA, conjunctiva and sclera clear  ENMT: No tonsillar erythema, exudates, or enlargement; Moist mucous membranes, Good dentition, No lesions  NECK: Supple, No JVD, Normal thyroid  NERVOUS SYSTEM:  Alert & Oriented X3, Good concentration; Motor Strength 5/5 B/L upper and lower extremities; DTRs 2+ intact and symmetric  CHEST/LUNG: Clear to percussion bilaterally; No rales, rhonchi, wheezing, or rubs  HEART: Regular rate and rhythm; No murmurs, rubs, or gallops  ABDOMEN: Soft, Nontender, Nondistended; Bowel sounds present  EXTREMITIES:  2+ Peripheral Pulses, No clubbing, cyanosis, or edema  LYMPH: No lymphadenopathy noted  SKIN: No rashes or lesions          Advanced care planning discussed with patient/family. Patient's health status was discussed. All appropriate changes have been made regarding patient's end-of-life care. Advanced care planning forms reviewed/discussed/completed.  20 minutes spent.

## 2020-08-27 NOTE — PROGRESS NOTE ADULT - PROBLEM SELECTOR PLAN 2
pod 2 left tkr  dvt proph w xarelto for 12 days
pod 1 left tkr  dvt proph w xarelto for 12 days
pod 3 left tkr  dvt proph w xarelto for 12 days

## 2020-08-28 ENCOUNTER — TRANSCRIPTION ENCOUNTER (OUTPATIENT)
Age: 80
End: 2020-08-28

## 2020-08-28 PROCEDURE — 97163 PT EVAL HIGH COMPLEX 45 MIN: CPT

## 2020-08-28 PROCEDURE — 85027 COMPLETE CBC AUTOMATED: CPT

## 2020-08-28 PROCEDURE — 73562 X-RAY EXAM OF KNEE 3: CPT

## 2020-08-28 PROCEDURE — C1713: CPT

## 2020-08-28 PROCEDURE — 88311 DECALCIFY TISSUE: CPT

## 2020-08-28 PROCEDURE — 97110 THERAPEUTIC EXERCISES: CPT

## 2020-08-28 PROCEDURE — C1776: CPT

## 2020-08-28 PROCEDURE — 97116 GAIT TRAINING THERAPY: CPT

## 2020-08-28 PROCEDURE — 97165 OT EVAL LOW COMPLEX 30 MIN: CPT

## 2020-08-28 PROCEDURE — 97535 SELF CARE MNGMENT TRAINING: CPT

## 2020-08-28 PROCEDURE — 82962 GLUCOSE BLOOD TEST: CPT

## 2020-08-28 PROCEDURE — 80048 BASIC METABOLIC PNL TOTAL CA: CPT

## 2020-08-28 PROCEDURE — 97530 THERAPEUTIC ACTIVITIES: CPT

## 2020-08-28 PROCEDURE — 88305 TISSUE EXAM BY PATHOLOGIST: CPT

## 2020-08-28 PROCEDURE — 36415 COLL VENOUS BLD VENIPUNCTURE: CPT

## 2020-08-28 RX ADMIN — HYDROMORPHONE HYDROCHLORIDE 4 MILLIGRAM(S): 2 INJECTION INTRAMUSCULAR; INTRAVENOUS; SUBCUTANEOUS at 01:24

## 2020-08-28 RX ADMIN — Medication 88 MICROGRAM(S): at 05:21

## 2020-08-28 RX ADMIN — CELECOXIB 200 MILLIGRAM(S): 200 CAPSULE ORAL at 05:21

## 2020-08-28 RX ADMIN — HYDROMORPHONE HYDROCHLORIDE 4 MILLIGRAM(S): 2 INJECTION INTRAMUSCULAR; INTRAVENOUS; SUBCUTANEOUS at 02:30

## 2020-08-28 NOTE — DISCHARGE NOTE NURSING/CASE MANAGEMENT/SOCIAL WORK - PATIENT PORTAL LINK FT
You can access the FollowMyHealth Patient Portal offered by [facility] by registering at the following website: [email or web address]. By joining [employer]’s FollowMyHealth portal, you will also be able to view your health information using other applications (apps) compatible with our system.

## 2020-09-01 ENCOUNTER — APPOINTMENT (OUTPATIENT)
Dept: CARE COORDINATION | Facility: HOME HEALTH | Age: 80
End: 2020-09-01
Payer: MEDICARE

## 2020-09-01 VITALS
HEART RATE: 80 BPM | RESPIRATION RATE: 17 BRPM | TEMPERATURE: 97.7 F | DIASTOLIC BLOOD PRESSURE: 70 MMHG | SYSTOLIC BLOOD PRESSURE: 130 MMHG

## 2020-09-01 DIAGNOSIS — Z96.651 PAIN DUE TO INTERNAL ORTHOPEDIC PROSTHETIC DEVICES, IMPLANTS AND GRAFTS, INITIAL ENCOUNTER: ICD-10-CM

## 2020-09-01 DIAGNOSIS — T84.84XA PAIN DUE TO INTERNAL ORTHOPEDIC PROSTHETIC DEVICES, IMPLANTS AND GRAFTS, INITIAL ENCOUNTER: ICD-10-CM

## 2020-09-01 PROCEDURE — 99024 POSTOP FOLLOW-UP VISIT: CPT

## 2020-09-01 RX ORDER — OXYCODONE 5 MG/1
5 TABLET ORAL
Qty: 50 | Refills: 0 | Status: DISCONTINUED | COMMUNITY
Start: 2018-02-13 | End: 2020-09-01

## 2020-09-01 RX ORDER — ZOLPIDEM TARTRATE 5 MG/1
TABLET, FILM COATED ORAL
Refills: 0 | Status: DISCONTINUED | COMMUNITY
End: 2020-09-01

## 2020-09-01 RX ORDER — OXYCODONE 5 MG/1
5 TABLET ORAL
Qty: 80 | Refills: 0 | Status: DISCONTINUED | COMMUNITY
Start: 2018-03-01 | End: 2020-09-01

## 2020-09-01 RX ORDER — LEVOTHYROXINE SODIUM 88 UG/1
88 TABLET ORAL DAILY
Refills: 0 | Status: ACTIVE | COMMUNITY
Start: 2020-09-01

## 2020-09-01 RX ORDER — CELECOXIB 200 MG/1
200 CAPSULE ORAL TWICE DAILY
Qty: 28 | Refills: 0 | Status: DISCONTINUED | COMMUNITY
Start: 2018-02-13 | End: 2020-09-01

## 2020-09-01 RX ORDER — OXYCODONE 5 MG/1
5 TABLET ORAL
Qty: 50 | Refills: 0 | Status: DISCONTINUED | COMMUNITY
Start: 2018-04-17 | End: 2020-09-01

## 2020-09-01 RX ORDER — DICLOFENAC SODIUM 10 MG/G
1 GEL TOPICAL
Qty: 200 | Refills: 0 | Status: DISCONTINUED | COMMUNITY
Start: 2017-08-24 | End: 2020-09-01

## 2020-09-01 RX ORDER — HYDROCODONE BITARTRATE AND ACETAMINOPHEN 5; 325 MG/1; MG/1
5-325 TABLET ORAL
Qty: 40 | Refills: 0 | Status: DISCONTINUED | COMMUNITY
Start: 2017-10-24 | End: 2020-09-01

## 2020-09-01 RX ORDER — MECLIZINE HYDROCHLORIDE 12.5 MG/1
12.5 TABLET ORAL
Qty: 10 | Refills: 0 | Status: DISCONTINUED | COMMUNITY
Start: 2018-02-01 | End: 2020-09-01

## 2020-09-01 RX ORDER — DIAZEPAM 10 MG/1
10 TABLET ORAL
Qty: 75 | Refills: 0 | Status: DISCONTINUED | COMMUNITY
Start: 2017-10-24 | End: 2020-09-01

## 2020-09-01 RX ORDER — OXYCODONE 5 MG/1
5 TABLET ORAL EVERY 4 HOURS
Qty: 42 | Refills: 0 | Status: ACTIVE | COMMUNITY
Start: 2020-09-01 | End: 1900-01-01

## 2020-09-01 RX ORDER — ACETAMINOPHEN 500 MG/1
500 TABLET, COATED ORAL EVERY 6 HOURS
Refills: 0 | Status: ACTIVE | COMMUNITY
Start: 2020-09-01

## 2020-09-01 RX ORDER — SENNOSIDES 8.6 MG/1
8.6 CAPSULE, GELATIN COATED ORAL
Refills: 0 | Status: ACTIVE | COMMUNITY
Start: 2020-09-01

## 2020-09-01 RX ORDER — RIVAROXABAN 10 MG/1
10 TABLET, FILM COATED ORAL
Qty: 30 | Refills: 0 | Status: ACTIVE | COMMUNITY
Start: 2020-09-01

## 2020-09-01 RX ORDER — FENOFIBRATE 134 MG/1
134 CAPSULE ORAL
Qty: 30 | Refills: 0 | Status: DISCONTINUED | COMMUNITY
Start: 2018-01-16 | End: 2020-09-01

## 2020-09-01 RX ORDER — MELOXICAM 15 MG/1
15 TABLET ORAL
Qty: 30 | Refills: 0 | Status: DISCONTINUED | COMMUNITY
Start: 2018-01-30 | End: 2020-09-01

## 2020-09-01 RX ORDER — OXYCODONE 5 MG/1
5 TABLET ORAL
Qty: 56 | Refills: 0 | Status: DISCONTINUED | COMMUNITY
Start: 2018-01-30 | End: 2020-09-01

## 2020-09-01 NOTE — PHYSICAL EXAM
[No Edema] : there was no peripheral edema [Normal] : soft, non-tender, non-distended, no masses palpated, no HSM and normal bowel sounds [de-identified] : slight swelling to left knee [de-identified] : dsg intact, minimal drainage noted, non tender, +redness noted to lateral aspect of knee

## 2020-09-01 NOTE — HISTORY OF PRESENT ILLNESS
[FreeTextEntry1] : f/u hospitalization for LTKR [de-identified] : 81 y/o/f with pmhx of hypothyroidism s/p elective LTKR  with Dr Leonidas Garcia on 8/24/2020. Patient d/c home with [facility] @ home. Patient c/o pain ranging from 2-10 yesterday evening even with taking medications.  Patient denies fever, chills, swelling, n/v,calf pain, reports pain to knee, reports + constipation with chronic hx\par \par hypothyroidism: stable no issues\par \par TCM COVID-19 screening protocol reviewed with patient and/or caregiver and denies any signs and symptoms.  Patient and/or care giver denies any contact with a suspect or known COVID-19 case wtihin the last 14 days.  Patient and/or caregiver have tested negative for COVID-19. PAtient and/or caregiver does not live in an assisted living or skilled nursing facility.  [place]ient and/or caregiver [place].\par \par \par Patient and/or caregiver verbalized understanding of the need to wear a mask or face covering during visit or can be provided with one if needed. PAtient and/or caregiver verbalized understanding that a temperature is to be taken on day of visit and if greater than 100 degrees F (37.8C) to inform the care navigator prior to visit. The patient and/or caregiver verbalized understanding that the care navigator will be in full PPE, will require a clear area to don/doff, and will have own waste bag to dispose of PPE in the home at the conclusion of the visit.\par

## 2020-09-01 NOTE — REVIEW OF SYSTEMS
[Constipation] : constipation [Joint Pain] : joint pain [Negative] : Respiratory [FreeTextEntry9] : see hpi

## 2020-09-09 ENCOUNTER — APPOINTMENT (OUTPATIENT)
Dept: CARE COORDINATION | Facility: HOME HEALTH | Age: 80
End: 2020-09-09
Payer: SELF-PAY

## 2020-09-09 VITALS
TEMPERATURE: 96.8 F | SYSTOLIC BLOOD PRESSURE: 130 MMHG | RESPIRATION RATE: 18 BRPM | OXYGEN SATURATION: 96 % | DIASTOLIC BLOOD PRESSURE: 72 MMHG | HEART RATE: 90 BPM

## 2020-09-09 DIAGNOSIS — Z71.9 COUNSELING, UNSPECIFIED: ICD-10-CM

## 2020-09-09 DIAGNOSIS — Z00.00 ENCOUNTER FOR GENERAL ADULT MEDICAL EXAMINATION W/OUT ABNORMAL FINDINGS: ICD-10-CM

## 2020-09-09 DIAGNOSIS — Z96.652 PRESENCE OF LEFT ARTIFICIAL KNEE JOINT: ICD-10-CM

## 2020-09-09 PROCEDURE — 99024 POSTOP FOLLOW-UP VISIT: CPT

## 2020-09-10 PROBLEM — Z71.9 HEALTH EDUCATION: Status: ACTIVE | Noted: 2020-09-10

## 2020-09-10 PROBLEM — Z96.652 STATUS POST LEFT KNEE REPLACEMENT: Status: ACTIVE | Noted: 2020-09-01

## 2020-09-10 RX ORDER — CEPHALEXIN 500 MG/1
500 CAPSULE ORAL 4 TIMES DAILY
Refills: 0 | Status: ACTIVE | COMMUNITY
Start: 2020-09-10

## 2020-09-10 NOTE — HISTORY OF PRESENT ILLNESS
[FreeTextEntry1] : f/u hospitalization for LTKR [de-identified] : 79 y/o/f with pmhx of hypothyroidism s/p elective LTKR  with Dr Leonidas Garcia on 8/24/2020. Patient d/c home with [facility]. Patient c/o pain ranging from 2-10 yesterday evening even with taking medications.  Patient denies fever, chills, swelling, n/v,calf pain, reports pain to knee, reports + constipation with chronic hx.  Patient had f/u with ortho sx and was started on abx, rec'd call from HCRN yesterday verbalizing that patient had not been compliant with medications.  Strongly encouraged to take as directed with food.  Patient reports today that she is complaint\par \par Health maintenance: UTD with shingles vacc, plans to take flu/pneum vac\par \par \par TCM COVID-19 screening protocol reviewed with patient and/or caregiver and denies any signs and symptoms.  Patient and/or care giver denies any contact with a suspect or known COVID-19 case wtihin the last 14 days.  Patient and/or caregiver have tested negative for COVID-19. PAtient and/or caregiver does not live in an assisted living or skilled nursing facility.  PAtient and/or caregiver [place] not [place] of the [place] the last 14 days.\par \par \par Patient and/or caregiver verbalized understanding of the need to wear a mask or face covering during visit or can be provided with one if needed. PAtient and/or caregiver verbalized understanding that a temperature is to be taken on day of visit and if greater than 100 degrees F (37.8C) to inform the care navigator prior to visit. The patient and/or caregiver verbalized understanding that the care navigator will be in full PPE, will require a clear area to don/doff, and will have own waste bag to dispose of PPE in the home at the conclusion of the visit.\par

## 2020-09-10 NOTE — PHYSICAL EXAM
[No Edema] : there was no peripheral edema [Normal] : soft, non-tender, non-distended, no masses palpated, no HSM and normal bowel sounds [de-identified] : slight swelling to left knee [de-identified] : redness resolved, incision CDI with scabbing noted, non tender, no inc warmth noted

## 2020-09-10 NOTE — CURRENT MEDS
[Takes medication as prescribed] : takes [Lack of understanding] : lack of understanding [FreeTextEntry1] : education provided informing importance of taking meds as directed

## 2020-09-10 NOTE — REVIEW OF SYSTEMS
[Joint Pain] : joint pain [Constipation] : constipation [Negative] : Cardiovascular [FreeTextEntry9] : see hpi

## 2020-09-12 ENCOUNTER — TRANSCRIPTION ENCOUNTER (OUTPATIENT)
Age: 80
End: 2020-09-12

## 2020-11-13 NOTE — OCCUPATIONAL THERAPY INITIAL EVALUATION ADULT - PHYSICAL ASSIST/NONPHYSICAL ASSIST:DRESS LOWER BODY, OT EVAL
Received request via: Pharmacy    Was the patient seen in the last year in this department? Yes    Does the patient have an active prescription (recently filled or refills available) for medication(s) requested? No  
1 person assist/set-up required/verbal cues

## 2021-11-01 NOTE — PROGRESS NOTE ADULT - SUBJECTIVE AND OBJECTIVE BOX
ANESTHESIA POSTOP CHECK    77y Female POSTOP DAY 1    NO APPARENT ANESTHESIA COMPLICATIONS [Negative] : Heme/Lymph [de-identified] : see hpi

## 2022-04-21 PROBLEM — Z00.00 ENCOUNTER FOR PREVENTIVE HEALTH EXAMINATION: Noted: 2022-04-21

## 2022-04-22 ENCOUNTER — APPOINTMENT (OUTPATIENT)
Dept: ORTHOPEDIC SURGERY | Facility: CLINIC | Age: 82
End: 2022-04-22

## 2022-05-06 ENCOUNTER — APPOINTMENT (OUTPATIENT)
Dept: ORTHOPEDIC SURGERY | Facility: CLINIC | Age: 82
End: 2022-05-06
Payer: MEDICARE

## 2022-05-06 VITALS — WEIGHT: 150 LBS | BODY MASS INDEX: 29.45 KG/M2 | HEIGHT: 60 IN

## 2022-05-06 PROCEDURE — 99213 OFFICE O/P EST LOW 20 MIN: CPT

## 2022-05-06 PROCEDURE — 73130 X-RAY EXAM OF HAND: CPT | Mod: LT

## 2022-05-06 NOTE — HISTORY OF PRESENT ILLNESS
[3] : 3 [Dull/Aching] : dull/aching [Sleep] : sleep [Nothing helps with pain getting better] : Nothing helps with pain getting better [Retired] : Work status: retired [de-identified] : 81 year old femael followed for LEFT middle trigger finger.  Sh eis 2 months s/p injection.  She states she has less pain, but is having more pain in the hand and arm.  Left hand pain which has gotten worse since her last visit.   [] : no [FreeTextEntry1] : left hand [FreeTextEntry7] : up her arm into her shoulder

## 2022-05-06 NOTE — PHYSICAL EXAM
[2nd] : 2nd [3rd] : 3rd [MCP Joint] : MCP joint [Left] : left hand [] : no ecchymosis [FreeTextEntry9] : Pain with forced flexion index and middle MP joitns [FreeTextEntry1] : Index and middle MP arthritis

## 2022-07-01 ENCOUNTER — APPOINTMENT (OUTPATIENT)
Dept: ORTHOPEDIC SURGERY | Facility: CLINIC | Age: 82
End: 2022-07-01

## 2023-02-09 NOTE — DISCHARGE NOTE ADULT - NSFTFCONTACT3DT_GEN_ALL_CORE
Detail Level: Detailed Add 19412 Cpt? (Important Note: In 2017 The Use Of 04039 Is Being Tracked By Cms To Determine Future Global Period Reimbursement For Global Periods): no Wound Evaluated By: Dr. Richter 30-Jan-2018

## 2023-04-25 NOTE — H&P PST ADULT - VISION (WITH CORRECTIVE LENSES IF THE PATIENT USUALLY WEARS THEM):
Patient Needs Assistance to Leave Residence... Partially impaired: cannot see medication labels or newsprint, but can see obstacles in path, and the surrounding layout; can count fingers at arm's length/Wears RX Eyeglasses

## 2023-06-23 NOTE — DISCHARGE NOTE ADULT - VISION (WITH CORRECTIVE LENSES IF THE PATIENT USUALLY WEARS THEM):
Her/She Normal vision: sees adequately in most situations; can see medication labels, newsprint/with glasses

## 2023-07-14 ENCOUNTER — APPOINTMENT (OUTPATIENT)
Dept: ORTHOPEDIC SURGERY | Facility: CLINIC | Age: 83
End: 2023-07-14
Payer: MEDICARE

## 2023-07-14 VITALS — BODY MASS INDEX: 24.11 KG/M2 | WEIGHT: 150 LBS | HEIGHT: 66 IN

## 2023-07-14 DIAGNOSIS — M65.332 TRIGGER FINGER, LEFT MIDDLE FINGER: ICD-10-CM

## 2023-07-14 PROCEDURE — 99213 OFFICE O/P EST LOW 20 MIN: CPT | Mod: 25

## 2023-07-14 PROCEDURE — 20605 DRAIN/INJ JOINT/BURSA W/O US: CPT | Mod: LT

## 2023-07-14 NOTE — PROCEDURE
[Small Joint Injection] : small joint injection [Right] : of the right [3rd] : 3rd [Metacarpalphalangeal joint] : metacarpalphalangeal joint [Pain] : pain [Inflammation] : inflammation [Alcohol] : alcohol [Ethyl Chloride sprayed topically] : ethyl chloride sprayed topically [Sterile technique used] : sterile technique used [___ cc    1%] : Lidocaine ~Vcc of 1%  [___ cc    10mg] : Triamcinolone (Kenalog) ~Vcc of 10 mg  [] : Patient tolerated procedure well [Risks, benefits, alternatives discussed / Verbal consent obtained] : the risks benefits, and alternatives have been discussed, and verbal consent was obtained

## 2023-07-14 NOTE — HISTORY OF PRESENT ILLNESS
[3] : 3 [Dull/Aching] : dull/aching [Sleep] : sleep [Nothing helps with pain getting better] : Nothing helps with pain getting better [Retired] : Work status: retired [de-identified] : 81 year old Female followed for LEFT middle trigger finger and MP arthritis. Trigger finger injected 4 months ago. No triggering. MP is tender. She is also with pain in the LT thumb.  [] : no [FreeTextEntry1] : left hand [FreeTextEntry5] : pain has returned, requesting CSI [FreeTextEntry7] : up her arm into her shoulder

## 2023-07-14 NOTE — PHYSICAL EXAM
[Left] : left hand [3rd] : 3rd [A1-Pulley] : A1-pulley [] : no erythema [de-identified] : first CMC tenderness  [de-identified] : forced middle MP flexion

## 2023-07-14 NOTE — DISCUSSION/SUMMARY
[de-identified] : Discussed the nature of the diagnosis and risk and benefits of different modalities of treatment. \par Discussed conservative management for LT thumb CMC pain.\par She would like a RT middle  CSI. \par Done today and tolerated well.\par \par

## 2023-07-19 ENCOUNTER — APPOINTMENT (OUTPATIENT)
Dept: ORTHOPEDIC SURGERY | Facility: CLINIC | Age: 83
End: 2023-07-19
Payer: MEDICARE

## 2023-07-19 VITALS — WEIGHT: 150 LBS | BODY MASS INDEX: 24.11 KG/M2 | HEIGHT: 66 IN

## 2023-07-19 DIAGNOSIS — M48.061 SPINAL STENOSIS, LUMBAR REGION WITHOUT NEUROGENIC CLAUDICATION: ICD-10-CM

## 2023-07-19 DIAGNOSIS — M54.50 LOW BACK PAIN, UNSPECIFIED: ICD-10-CM

## 2023-07-19 PROCEDURE — 99214 OFFICE O/P EST MOD 30 MIN: CPT

## 2023-07-19 NOTE — HISTORY OF PRESENT ILLNESS
[Lower back] : lower back [2] : 2 [Dull/Aching] : dull/aching [Sharp] : sharp [] : yes [de-identified] : 84yo female presenting with low back pain for several years, denies radiating pain, n/t, change in b/b function. Patient denies acute trauma/injury. Patient has gone to Pain management and several JOSE with minimal relief, last injection was ~10yrs ago. Patient has completed course in 2020 now doing HEP and stretching. Patient takes tramadol prn.\par \par MRI of low back completed 4/18/23 with following impression:\par At L1-2, there is a disc bulge with thecal sac impingement. This is stable. \par At L2-3, there is a disc bulge with thecal sac impingement and bilateral foraminal impingement. This is stable.  \par At L3-4, there is a disc bulge and a right foraminal herniation. There is thecal sac impingement and right foraminal impingement. This is stable.  \par At L4-5, there is a broad-based disc bulge with thecal sac impingement and bilateral foraminal impingement. This is stable.  \par At L5-S1, there is a disc bulge and a right foraminal herniation. There is thecal sac impingement and right foraminal impingement. This is stable.\par Dextroscoliosis.\par \par PmHx: HTN, hypothyroid\par \par Occupation: Retired-teacher/\par \par \par

## 2023-07-19 NOTE — ASSESSMENT
[FreeTextEntry1] : 83 F with multilevel degen changes and stenosis.  Pain in back radiating to buttocks.  BIggest issue is her back pain.   Has failed JOSE and MBB\par Discussed trial of Intracept vs Spinal cord stim\par FU with pain management

## 2023-08-07 NOTE — PATIENT PROFILE ADULT. - NSALCOHOLTYPE_GEN__A_CORE_SD
EGD and colonoscopy
wine
Wartpeel Counseling:  I discussed with the patient the risks of Wartpeel including but not limited to erythema, scaling, itching, weeping, crusting, and pain.

## 2023-08-11 ENCOUNTER — APPOINTMENT (OUTPATIENT)
Dept: ORTHOPEDIC SURGERY | Facility: CLINIC | Age: 83
End: 2023-08-11

## 2023-09-15 ASSESSMENT — KOOS JR
GOING UP OR DOWN STAIRS: SEVERE
STANDING UPRIGHT: MILD
KOOS JR RAW SCORE: 10
IMPORTED KOOS JR SCORE: 54.84
GOING UP OR DOWN STAIRS: SEVERE
RISING FROM SITTING: MILD
BENDING TO THE FLOOR TO PICK UP OBJECT: MILD
IMPORTED LATERALITY: RIGHT
GOING UP OR DOWN STAIRS: MODERATE
IMPORTED FORM: YES
IMPORTED FORM: YES
KOOS JR RAW SCORE: 13
GOING UP OR DOWN STAIRS: MODERATE
TWISING OR PIVOTING ON KNEE: SEVERE
STRAIGHTENING KNEE FULLY: MILD
IMPORTED KOOS JR SCORE: 61.58
HOW SEVERE IS YOUR KNEE STIFFNESS AFTER FIRST WAKING IN MORNING: MILD
IMPORTED FORM: YES
IMPORTED FORM: YES
IMPORTED LATERALITY: RIGHT
STRAIGHTENING KNEE FULLY: MILD
RISING FROM SITTING: MILD
RISING FROM SITTING: MODERATE
BENDING TO THE FLOOR TO PICK UP OBJECT: MILD
KOOS JR RAW SCORE: 10
IMPORTED KOOS JR SCORE: 61.58
IMPORTED KOOS JR SCORE: 54.84
STANDING UPRIGHT: MILD
BENDING TO THE FLOOR TO PICK UP OBJECT: MILD
KOOS JR RAW SCORE: 10
KOOS JR RAW SCORE: 10
HOW SEVERE IS YOUR KNEE STIFFNESS AFTER FIRST WAKING IN MORNING: MODERATE
TWISING OR PIVOTING ON KNEE: SEVERE
TWISING OR PIVOTING ON KNEE: SEVERE
IMPORTED LATERALITY: RIGHT
TWISING OR PIVOTING ON KNEE: SEVERE
BENDING TO THE FLOOR TO PICK UP OBJECT: MILD
STRAIGHTENING KNEE FULLY: MODERATE
TWISING OR PIVOTING ON KNEE: MODERATE
GOING UP OR DOWN STAIRS: MODERATE
STRAIGHTENING KNEE FULLY: MODERATE
HOW SEVERE IS YOUR KNEE STIFFNESS AFTER FIRST WAKING IN MORNING: MODERATE
KOOS JR RAW SCORE: 13
RISING FROM SITTING: MODERATE
TWISING OR PIVOTING ON KNEE: MODERATE
HOW SEVERE IS YOUR KNEE STIFFNESS AFTER FIRST WAKING IN MORNING: MILD
STANDING UPRIGHT: MILD
IMPORTED KOOS JR SCORE: 61.58
IMPORTED KOOS JR SCORE: 61.58
IMPORTED LATERALITY: RIGHT
STANDING UPRIGHT: MILD
GOING UP OR DOWN STAIRS: MODERATE

## 2024-01-02 ENCOUNTER — APPOINTMENT (OUTPATIENT)
Dept: CT IMAGING | Facility: CLINIC | Age: 84
End: 2024-01-02
Payer: SELF-PAY

## 2024-01-02 PROCEDURE — 75571 CT HRT W/O DYE W/CA TEST: CPT

## 2024-05-28 NOTE — H&P PST ADULT - GASTROINTESTINAL
From: Elsy Claire  To: Zarina Grigsby  Sent: 5/28/2024 3:57 PM EDT  Subject: Adderall     Hi there! Could I please have my adderall refilled at the [facility] on [address]?     Thank you so much!   details… detailed exam

## 2024-05-31 ENCOUNTER — APPOINTMENT (OUTPATIENT)
Dept: ORTHOPEDIC SURGERY | Facility: CLINIC | Age: 84
End: 2024-05-31
Payer: MEDICARE

## 2024-05-31 VITALS — HEIGHT: 66 IN | BODY MASS INDEX: 24.11 KG/M2 | WEIGHT: 150 LBS

## 2024-05-31 DIAGNOSIS — M19.042 PRIMARY OSTEOARTHRITIS, LEFT HAND: ICD-10-CM

## 2024-05-31 PROCEDURE — 20600 DRAIN/INJ JOINT/BURSA W/O US: CPT | Mod: LT

## 2024-05-31 PROCEDURE — 99213 OFFICE O/P EST LOW 20 MIN: CPT | Mod: 25

## 2024-05-31 NOTE — HISTORY OF PRESENT ILLNESS
[Lower back] : lower back [3] : 3 [Dull/Aching] : dull/aching [Sharp] : sharp [Sleep] : sleep [Nothing helps with pain getting better] : Nothing helps with pain getting better [Retired] : Work status: retired [de-identified] : 81 year old Female followed for LEFT middle trigger finger and MP arthritis. Trigger finger injected in the past with god relief.  Nearly one year ago, MP joint injected [] : no [FreeTextEntry1] : left hand [FreeTextEntry5] : pain has returned, requesting CSI [FreeTextEntry7] : up her arm into her shoulder

## 2024-05-31 NOTE — DISCUSSION/SUMMARY
[de-identified] : Discussed the nature of the diagnosis and risk and benefits of different modalities of treatment. \par  Discussed conservative management for LT thumb CMC pain.\par  She would like a RT middle  CSI. \par  Done today and tolerated well.\par  \par

## 2024-05-31 NOTE — PHYSICAL EXAM
[Left] : left hand [3rd] : 3rd [A1-Pulley] : A1-pulley [] : no erythema [de-identified] : first CMC tenderness  [de-identified] : forced middle MP flexion

## 2024-07-11 ENCOUNTER — APPOINTMENT (OUTPATIENT)
Dept: ORTHOPEDIC SURGERY | Facility: CLINIC | Age: 84
End: 2024-07-11
Payer: MEDICARE

## 2024-07-11 VITALS — HEIGHT: 60 IN | WEIGHT: 152 LBS | BODY MASS INDEX: 29.84 KG/M2

## 2024-07-11 DIAGNOSIS — M75.42 IMPINGEMENT SYNDROME OF LEFT SHOULDER: ICD-10-CM

## 2024-07-11 PROCEDURE — 73030 X-RAY EXAM OF SHOULDER: CPT | Mod: LT

## 2024-07-11 PROCEDURE — 99213 OFFICE O/P EST LOW 20 MIN: CPT

## 2024-07-11 RX ORDER — LABETALOL HYDROCHLORIDE 100 MG/1
100 TABLET, FILM COATED ORAL
Refills: 0 | Status: ACTIVE | COMMUNITY

## 2024-09-20 NOTE — PATIENT PROFILE ADULT - BILL PAYMENT
no Detail Level: Detailed Quality 431: Preventive Care And Screening: Unhealthy Alcohol Use - Screening: Patient not identified as an unhealthy alcohol user when screened for unhealthy alcohol use using a systematic screening method Quality 130: Documentation Of Current Medications In The Medical Record: Current Medications Documented Quality 226: Preventive Care And Screening: Tobacco Use: Screening And Cessation Intervention: Patient screened for tobacco use and is an ex/non-smoker

## 2025-07-09 ENCOUNTER — APPOINTMENT (OUTPATIENT)
Dept: ORTHOPEDIC SURGERY | Facility: CLINIC | Age: 85
End: 2025-07-09
Payer: MEDICARE

## 2025-07-09 VITALS — BODY MASS INDEX: 29.64 KG/M2 | HEIGHT: 60 IN | WEIGHT: 151 LBS

## 2025-07-09 PROBLEM — M47.812 DEGENERATIVE ARTHRITIS OF CERVICAL SPINE: Status: ACTIVE | Noted: 2025-07-09

## 2025-07-09 PROCEDURE — 72050 X-RAY EXAM NECK SPINE 4/5VWS: CPT

## 2025-07-09 PROCEDURE — 99214 OFFICE O/P EST MOD 30 MIN: CPT

## 2025-08-20 ENCOUNTER — APPOINTMENT (OUTPATIENT)
Dept: ORTHOPEDIC SURGERY | Facility: CLINIC | Age: 85
End: 2025-08-20